# Patient Record
Sex: MALE | Race: BLACK OR AFRICAN AMERICAN | NOT HISPANIC OR LATINO | URBAN - METROPOLITAN AREA
[De-identification: names, ages, dates, MRNs, and addresses within clinical notes are randomized per-mention and may not be internally consistent; named-entity substitution may affect disease eponyms.]

---

## 2014-04-25 RX ORDER — DIAZEPAM 5 MG
1 TABLET ORAL
Qty: 0 | Refills: 0 | COMMUNITY
Start: 2014-04-25

## 2016-03-06 RX ORDER — OXYCODONE HYDROCHLORIDE 5 MG/1
1 TABLET ORAL
Qty: 0 | Refills: 0 | COMMUNITY
Start: 2016-03-06

## 2017-01-25 ENCOUNTER — OUTPATIENT (OUTPATIENT)
Dept: OUTPATIENT SERVICES | Facility: HOSPITAL | Age: 62
LOS: 1 days | Discharge: HOME | End: 2017-01-25

## 2017-01-25 DIAGNOSIS — C61 MALIGNANT NEOPLASM OF PROSTATE: ICD-10-CM

## 2017-01-26 ENCOUNTER — RESULT REVIEW (OUTPATIENT)
Age: 62
End: 2017-01-26

## 2017-01-27 ENCOUNTER — APPOINTMENT (OUTPATIENT)
Dept: HEMATOLOGY ONCOLOGY | Facility: CLINIC | Age: 62
End: 2017-01-27

## 2017-01-27 VITALS
OXYGEN SATURATION: 99 % | BODY MASS INDEX: 30.48 KG/M2 | WEIGHT: 176.37 LBS | RESPIRATION RATE: 16 BRPM | HEART RATE: 79 BPM | TEMPERATURE: 98.5 F | DIASTOLIC BLOOD PRESSURE: 80 MMHG | SYSTOLIC BLOOD PRESSURE: 120 MMHG

## 2017-01-27 LAB
HCT VFR BLD CALC: 34 % — LOW (ref 39–50)
HGB BLD-MCNC: 11.3 G/DL — LOW (ref 13–17)
MCHC RBC-ENTMCNC: 28.9 PG — SIGNIFICANT CHANGE UP (ref 27–34)
MCHC RBC-ENTMCNC: 33.3 GM/DL — SIGNIFICANT CHANGE UP (ref 32–36)
MCV RBC AUTO: 86.7 FL — SIGNIFICANT CHANGE UP (ref 80–100)
PLATELET # BLD AUTO: 180 K/UL — SIGNIFICANT CHANGE UP (ref 150–400)
RBC # BLD: 3.92 M/UL — LOW (ref 4.2–5.8)
RBC # FLD: 12.4 % — SIGNIFICANT CHANGE UP (ref 10.3–14.5)
WBC # BLD: 6.5 K/UL — SIGNIFICANT CHANGE UP (ref 3.8–10.5)
WBC # FLD AUTO: 6.5 K/UL — SIGNIFICANT CHANGE UP (ref 3.8–10.5)

## 2017-01-30 LAB
ALBUMIN SERPL ELPH-MCNC: 3.4 G/DL
ALP BLD-CCNC: 96 U/L
ALT SERPL-CCNC: 12 U/L
ANION GAP SERPL CALC-SCNC: 11 MMOL/L
AST SERPL-CCNC: 15 U/L
BILIRUB SERPL-MCNC: 0.4 MG/DL
BUN SERPL-MCNC: 21 MG/DL
CALCIUM SERPL-MCNC: 8.8 MG/DL
CHLORIDE SERPL-SCNC: 108 MMOL/L
CO2 SERPL-SCNC: 28 MMOL/L
CREAT SERPL-MCNC: 0.92 MG/DL
GLUCOSE SERPL-MCNC: 98 MG/DL
LDH SERPL-CCNC: 134 U/L
POTASSIUM SERPL-SCNC: 3.9 MMOL/L
PROT SERPL-MCNC: 5.4 G/DL
PSA SERPL-MCNC: 75.41 NG/ML
SODIUM SERPL-SCNC: 147 MMOL/L

## 2017-02-25 ENCOUNTER — EMERGENCY (EMERGENCY)
Facility: HOSPITAL | Age: 62
LOS: 1 days | Discharge: HOME | End: 2017-02-25
Attending: EMERGENCY MEDICINE | Admitting: EMERGENCY MEDICINE
Payer: COMMERCIAL

## 2017-02-25 VITALS
RESPIRATION RATE: 16 BRPM | DIASTOLIC BLOOD PRESSURE: 85 MMHG | OXYGEN SATURATION: 99 % | TEMPERATURE: 98 F | SYSTOLIC BLOOD PRESSURE: 146 MMHG | HEART RATE: 64 BPM

## 2017-02-25 VITALS
HEART RATE: 72 BPM | SYSTOLIC BLOOD PRESSURE: 152 MMHG | DIASTOLIC BLOOD PRESSURE: 83 MMHG | RESPIRATION RATE: 18 BRPM | OXYGEN SATURATION: 98 %

## 2017-02-25 DIAGNOSIS — Z91.048 OTHER NONMEDICINAL SUBSTANCE ALLERGY STATUS: ICD-10-CM

## 2017-02-25 DIAGNOSIS — M54.5 LOW BACK PAIN: ICD-10-CM

## 2017-02-25 DIAGNOSIS — I10 ESSENTIAL (PRIMARY) HYPERTENSION: ICD-10-CM

## 2017-02-25 LAB
ALBUMIN SERPL ELPH-MCNC: 3.3 G/DL — SIGNIFICANT CHANGE UP (ref 3.3–5)
ALP SERPL-CCNC: 97 U/L — SIGNIFICANT CHANGE UP (ref 40–120)
ALT FLD-CCNC: 11 U/L RC — SIGNIFICANT CHANGE UP (ref 10–45)
ANION GAP SERPL CALC-SCNC: 12 MMOL/L — SIGNIFICANT CHANGE UP (ref 5–17)
APPEARANCE UR: CLEAR — SIGNIFICANT CHANGE UP
APTT BLD: 31.3 SEC — SIGNIFICANT CHANGE UP (ref 27.5–37.4)
AST SERPL-CCNC: 13 U/L — SIGNIFICANT CHANGE UP (ref 10–40)
BACTERIA # UR AUTO: ABNORMAL /HPF
BASOPHILS # BLD AUTO: 0.1 K/UL — SIGNIFICANT CHANGE UP (ref 0–0.2)
BASOPHILS NFR BLD AUTO: 0.9 % — SIGNIFICANT CHANGE UP (ref 0–2)
BILIRUB SERPL-MCNC: 0.4 MG/DL — SIGNIFICANT CHANGE UP (ref 0.2–1.2)
BILIRUB UR-MCNC: NEGATIVE — SIGNIFICANT CHANGE UP
BUN SERPL-MCNC: 20 MG/DL — SIGNIFICANT CHANGE UP (ref 7–23)
CALCIUM SERPL-MCNC: 8.7 MG/DL — SIGNIFICANT CHANGE UP (ref 8.4–10.5)
CHLORIDE SERPL-SCNC: 108 MMOL/L — SIGNIFICANT CHANGE UP (ref 96–108)
CO2 SERPL-SCNC: 25 MMOL/L — SIGNIFICANT CHANGE UP (ref 22–31)
COLOR SPEC: YELLOW — SIGNIFICANT CHANGE UP
CREAT SERPL-MCNC: 0.94 MG/DL — SIGNIFICANT CHANGE UP (ref 0.5–1.3)
DIFF PNL FLD: ABNORMAL
EOSINOPHIL # BLD AUTO: 0 K/UL — SIGNIFICANT CHANGE UP (ref 0–0.5)
EOSINOPHIL NFR BLD AUTO: 0.7 % — SIGNIFICANT CHANGE UP (ref 0–6)
EPI CELLS # UR: SIGNIFICANT CHANGE UP /HPF
GLUCOSE SERPL-MCNC: 95 MG/DL — SIGNIFICANT CHANGE UP (ref 70–99)
GLUCOSE UR QL: NEGATIVE — SIGNIFICANT CHANGE UP
HCT VFR BLD CALC: 32.5 % — LOW (ref 39–50)
HGB BLD-MCNC: 10.9 G/DL — LOW (ref 13–17)
INR BLD: 1.08 RATIO — SIGNIFICANT CHANGE UP (ref 0.88–1.16)
KETONES UR-MCNC: NEGATIVE — SIGNIFICANT CHANGE UP
LEUKOCYTE ESTERASE UR-ACNC: NEGATIVE — SIGNIFICANT CHANGE UP
LYMPHOCYTES # BLD AUTO: 1.8 K/UL — SIGNIFICANT CHANGE UP (ref 1–3.3)
LYMPHOCYTES # BLD AUTO: 33.1 % — SIGNIFICANT CHANGE UP (ref 13–44)
MCHC RBC-ENTMCNC: 29.3 PG — SIGNIFICANT CHANGE UP (ref 27–34)
MCHC RBC-ENTMCNC: 33.7 GM/DL — SIGNIFICANT CHANGE UP (ref 32–36)
MCV RBC AUTO: 87.2 FL — SIGNIFICANT CHANGE UP (ref 80–100)
MONOCYTES # BLD AUTO: 0.3 K/UL — SIGNIFICANT CHANGE UP (ref 0–0.9)
MONOCYTES NFR BLD AUTO: 6 % — SIGNIFICANT CHANGE UP (ref 2–14)
NEUTROPHILS # BLD AUTO: 3.2 K/UL — SIGNIFICANT CHANGE UP (ref 1.8–7.4)
NEUTROPHILS NFR BLD AUTO: 59.3 % — SIGNIFICANT CHANGE UP (ref 43–77)
NITRITE UR-MCNC: NEGATIVE — SIGNIFICANT CHANGE UP
PH UR: 6.5 — SIGNIFICANT CHANGE UP (ref 4.8–8)
PLATELET # BLD AUTO: 193 K/UL — SIGNIFICANT CHANGE UP (ref 150–400)
POTASSIUM SERPL-MCNC: 4 MMOL/L — SIGNIFICANT CHANGE UP (ref 3.5–5.3)
POTASSIUM SERPL-SCNC: 4 MMOL/L — SIGNIFICANT CHANGE UP (ref 3.5–5.3)
PROT SERPL-MCNC: 5.4 G/DL — LOW (ref 6–8.3)
PROT UR-MCNC: 30 MG/DL
PROTHROM AB SERPL-ACNC: 11.8 SEC — SIGNIFICANT CHANGE UP (ref 10–13.1)
RBC # BLD: 3.73 M/UL — LOW (ref 4.2–5.8)
RBC # FLD: 12.2 % — SIGNIFICANT CHANGE UP (ref 10.3–14.5)
RBC CASTS # UR COMP ASSIST: ABNORMAL /HPF (ref 0–2)
SODIUM SERPL-SCNC: 145 MMOL/L — SIGNIFICANT CHANGE UP (ref 135–145)
SP GR SPEC: 1.02 — SIGNIFICANT CHANGE UP (ref 1.01–1.02)
UROBILINOGEN FLD QL: NEGATIVE — SIGNIFICANT CHANGE UP
WBC # BLD: 5.5 K/UL — SIGNIFICANT CHANGE UP (ref 3.8–10.5)
WBC # FLD AUTO: 5.5 K/UL — SIGNIFICANT CHANGE UP (ref 3.8–10.5)
WBC UR QL: SIGNIFICANT CHANGE UP /HPF (ref 0–5)

## 2017-02-25 PROCEDURE — 99284 EMERGENCY DEPT VISIT MOD MDM: CPT

## 2017-02-25 PROCEDURE — 85027 COMPLETE CBC AUTOMATED: CPT

## 2017-02-25 PROCEDURE — 85730 THROMBOPLASTIN TIME PARTIAL: CPT

## 2017-02-25 PROCEDURE — 85610 PROTHROMBIN TIME: CPT

## 2017-02-25 PROCEDURE — 80053 COMPREHEN METABOLIC PANEL: CPT

## 2017-02-25 PROCEDURE — 81001 URINALYSIS AUTO W/SCOPE: CPT

## 2017-02-25 RX ORDER — DIAZEPAM 5 MG
5 TABLET ORAL ONCE
Qty: 0 | Refills: 0 | Status: DISCONTINUED | OUTPATIENT
Start: 2017-02-25 | End: 2017-02-25

## 2017-02-25 RX ORDER — SODIUM CHLORIDE 9 MG/ML
1000 INJECTION INTRAMUSCULAR; INTRAVENOUS; SUBCUTANEOUS
Qty: 0 | Refills: 0 | Status: DISCONTINUED | OUTPATIENT
Start: 2017-02-25 | End: 2017-03-01

## 2017-02-25 RX ORDER — SODIUM CHLORIDE 9 MG/ML
3 INJECTION INTRAMUSCULAR; INTRAVENOUS; SUBCUTANEOUS ONCE
Qty: 0 | Refills: 0 | Status: COMPLETED | OUTPATIENT
Start: 2017-02-25 | End: 2017-02-25

## 2017-02-25 RX ORDER — OXYCODONE HYDROCHLORIDE 5 MG/1
5 TABLET ORAL ONCE
Qty: 0 | Refills: 0 | Status: DISCONTINUED | OUTPATIENT
Start: 2017-02-25 | End: 2017-02-25

## 2017-02-25 RX ADMIN — OXYCODONE HYDROCHLORIDE 5 MILLIGRAM(S): 5 TABLET ORAL at 11:15

## 2017-02-25 RX ADMIN — OXYCODONE HYDROCHLORIDE 5 MILLIGRAM(S): 5 TABLET ORAL at 10:12

## 2017-02-25 RX ADMIN — SODIUM CHLORIDE 3 MILLILITER(S): 9 INJECTION INTRAMUSCULAR; INTRAVENOUS; SUBCUTANEOUS at 10:13

## 2017-02-25 RX ADMIN — Medication 5 MILLIGRAM(S): at 10:13

## 2017-02-25 RX ADMIN — SODIUM CHLORIDE 125 MILLILITER(S): 9 INJECTION INTRAMUSCULAR; INTRAVENOUS; SUBCUTANEOUS at 10:13
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
## 2017-02-25 NOTE — ED PROVIDER NOTE - OBJECTIVE STATEMENT
62yo with hx of htn, prostate CA,  chronic left leg swelling comes to the ED with complaints of back pain for the past 2 night, pain in the right lower lower back and left upper back , comes and goes but now constant, no radiation of the pain, no releived with massage, has had this pain in the past before, currently taking oral chemo for the prostate ca, no f.c no numbness no tingling reported, no f.c no cp no sob, no abd pain, no difficulty urination or with bowel movement.

## 2017-02-25 NOTE — ED PROVIDER NOTE - PHYSICAL EXAMINATION
att exam: patient awake alert NAD . LUNGS CTAB no wheeze no crackle. CARD RRR no m/r/g.  Abdomen soft NT ND no rebound no guarding no CVA tenderness. EXT WWP no edema no calf tenderness CV 2+DP/PT bilaterally. neuro A&Ox3 gait normal.  skin warm and dry no rash TTP to the right lower paraspinal region, no midline lower back pain, TTP to the left upper subscapular area, no midline pain, 5/5 strenght sensation intact upper and lower ext.  2+ edema of the left lower ext.

## 2017-02-25 NOTE — ED PROVIDER NOTE - ATTENDING CONTRIBUTION TO CARE
60 yo with hx of prostate ca, sciatica, htn, comes to the ED with complaints of right lower and left upper back pain for the past few days, has had this pain in the past before, no f.c no redflag back pain symtpoms, but given CA hx will check labs, xray and reassess.

## 2017-02-26 NOTE — ED POST DISCHARGE NOTE - OTHER COMMUNICATION
Spoke with patient regarding UA results. Denies fever, dysuria, gross hematuria. Rec f/u with PCP to repeat UA. - Kendal Thomas PA-C

## 2017-02-27 ENCOUNTER — OTHER (OUTPATIENT)
Age: 62
End: 2017-02-27

## 2017-02-28 ENCOUNTER — RESULT REVIEW (OUTPATIENT)
Age: 62
End: 2017-02-28

## 2017-02-28 ENCOUNTER — OUTPATIENT (OUTPATIENT)
Dept: OUTPATIENT SERVICES | Facility: HOSPITAL | Age: 62
LOS: 1 days | Discharge: HOME | End: 2017-02-28
Payer: COMMERCIAL

## 2017-02-28 DIAGNOSIS — C78.7 SECONDARY MALIGNANT NEOPLASM OF LIVER AND INTRAHEPATIC BILE DUCT: ICD-10-CM

## 2017-02-28 DIAGNOSIS — C61 MALIGNANT NEOPLASM OF PROSTATE: ICD-10-CM

## 2017-03-01 ENCOUNTER — APPOINTMENT (OUTPATIENT)
Dept: HEMATOLOGY ONCOLOGY | Facility: CLINIC | Age: 62
End: 2017-03-01

## 2017-03-01 ENCOUNTER — APPOINTMENT (OUTPATIENT)
Dept: INFUSION THERAPY | Facility: HOSPITAL | Age: 62
End: 2017-03-01

## 2017-03-01 VITALS
SYSTOLIC BLOOD PRESSURE: 131 MMHG | HEART RATE: 71 BPM | RESPIRATION RATE: 16 BRPM | TEMPERATURE: 98.1 F | DIASTOLIC BLOOD PRESSURE: 78 MMHG | WEIGHT: 178.13 LBS | BODY MASS INDEX: 30.79 KG/M2 | OXYGEN SATURATION: 98 %

## 2017-03-01 LAB
HCT VFR BLD CALC: 33.7 % — LOW (ref 39–50)
HGB BLD-MCNC: 11.2 G/DL — LOW (ref 13–17)
MCHC RBC-ENTMCNC: 28.7 PG — SIGNIFICANT CHANGE UP (ref 27–34)
MCHC RBC-ENTMCNC: 33.3 G/DL — SIGNIFICANT CHANGE UP (ref 32–36)
MCV RBC AUTO: 86.2 FL — SIGNIFICANT CHANGE UP (ref 80–100)
PLATELET # BLD AUTO: 233 K/UL — SIGNIFICANT CHANGE UP (ref 150–400)
RBC # BLD: 3.92 M/UL — LOW (ref 4.2–5.8)
RBC # FLD: 11.8 % — SIGNIFICANT CHANGE UP (ref 10.3–14.5)
WBC # BLD: 6.8 K/UL — SIGNIFICANT CHANGE UP (ref 3.8–10.5)
WBC # FLD AUTO: 6.8 K/UL — SIGNIFICANT CHANGE UP (ref 3.8–10.5)

## 2017-03-03 LAB
ALBUMIN SERPL ELPH-MCNC: 3.3 G/DL
ALP BLD-CCNC: 123 U/L
ALT SERPL-CCNC: 14 U/L
ANION GAP SERPL CALC-SCNC: 15 MMOL/L
AST SERPL-CCNC: 13 U/L
BILIRUB SERPL-MCNC: 0.4 MG/DL
BUN SERPL-MCNC: 18 MG/DL
CALCIUM SERPL-MCNC: 9.2 MG/DL
CHLORIDE SERPL-SCNC: 109 MMOL/L
CO2 SERPL-SCNC: 23 MMOL/L
CREAT SERPL-MCNC: 0.94 MG/DL
GLUCOSE SERPL-MCNC: 93 MG/DL
LDH SERPL-CCNC: 139 U/L
POTASSIUM SERPL-SCNC: 4.3 MMOL/L
PROT SERPL-MCNC: 5.4 G/DL
PSA SERPL-MCNC: 125.4 NG/ML
SODIUM SERPL-SCNC: 147 MMOL/L
TESTOST SERPL-MCNC: <2.5 NG/DL

## 2017-03-09 ENCOUNTER — APPOINTMENT (OUTPATIENT)
Dept: NUCLEAR MEDICINE | Facility: IMAGING CENTER | Age: 62
End: 2017-03-09

## 2017-03-09 ENCOUNTER — APPOINTMENT (OUTPATIENT)
Dept: CT IMAGING | Facility: IMAGING CENTER | Age: 62
End: 2017-03-09

## 2017-03-09 ENCOUNTER — OUTPATIENT (OUTPATIENT)
Dept: OUTPATIENT SERVICES | Facility: HOSPITAL | Age: 62
LOS: 1 days | End: 2017-03-09
Payer: COMMERCIAL

## 2017-03-09 DIAGNOSIS — C78.7 SECONDARY MALIGNANT NEOPLASM OF LIVER AND INTRAHEPATIC BILE DUCT: ICD-10-CM

## 2017-03-09 DIAGNOSIS — C61 MALIGNANT NEOPLASM OF PROSTATE: ICD-10-CM

## 2017-03-09 PROCEDURE — 74177 CT ABD & PELVIS W/CONTRAST: CPT

## 2017-03-09 PROCEDURE — A9561: CPT

## 2017-03-09 PROCEDURE — 78306 BONE IMAGING WHOLE BODY: CPT

## 2017-03-09 PROCEDURE — 78999 UNLISTED MISC PX DX NUC MED: CPT

## 2017-03-17 ENCOUNTER — OTHER (OUTPATIENT)
Age: 62
End: 2017-03-17

## 2017-03-20 ENCOUNTER — RX RENEWAL (OUTPATIENT)
Age: 62
End: 2017-03-20

## 2017-03-20 ENCOUNTER — RESULT REVIEW (OUTPATIENT)
Age: 62
End: 2017-03-20

## 2017-03-21 ENCOUNTER — FORM ENCOUNTER (OUTPATIENT)
Age: 62
End: 2017-03-21

## 2017-03-21 ENCOUNTER — APPOINTMENT (OUTPATIENT)
Dept: HEMATOLOGY ONCOLOGY | Facility: CLINIC | Age: 62
End: 2017-03-21

## 2017-03-21 VITALS
BODY MASS INDEX: 30.22 KG/M2 | SYSTOLIC BLOOD PRESSURE: 118 MMHG | HEART RATE: 87 BPM | OXYGEN SATURATION: 98 % | DIASTOLIC BLOOD PRESSURE: 82 MMHG | WEIGHT: 174.82 LBS | TEMPERATURE: 99 F

## 2017-03-21 LAB
HCT VFR BLD CALC: 34.2 % — LOW (ref 39–50)
HGB BLD-MCNC: 11.6 G/DL — LOW (ref 13–17)
MCHC RBC-ENTMCNC: 28.7 PG — SIGNIFICANT CHANGE UP (ref 27–34)
MCHC RBC-ENTMCNC: 33.9 G/DL — SIGNIFICANT CHANGE UP (ref 32–36)
MCV RBC AUTO: 84.6 FL — SIGNIFICANT CHANGE UP (ref 80–100)
PLATELET # BLD AUTO: 279 K/UL — SIGNIFICANT CHANGE UP (ref 150–400)
RBC # BLD: 4.04 M/UL — LOW (ref 4.2–5.8)
RBC # FLD: 12 % — SIGNIFICANT CHANGE UP (ref 10.3–14.5)
WBC # BLD: 6.8 K/UL — SIGNIFICANT CHANGE UP (ref 3.8–10.5)
WBC # FLD AUTO: 6.8 K/UL — SIGNIFICANT CHANGE UP (ref 3.8–10.5)

## 2017-03-21 PROCEDURE — 93010 ELECTROCARDIOGRAM REPORT: CPT

## 2017-03-22 ENCOUNTER — APPOINTMENT (OUTPATIENT)
Dept: MRI IMAGING | Facility: CLINIC | Age: 62
End: 2017-03-22

## 2017-03-22 ENCOUNTER — OUTPATIENT (OUTPATIENT)
Dept: OUTPATIENT SERVICES | Facility: HOSPITAL | Age: 62
LOS: 1 days | End: 2017-03-22
Payer: COMMERCIAL

## 2017-03-22 DIAGNOSIS — C61 MALIGNANT NEOPLASM OF PROSTATE: ICD-10-CM

## 2017-03-22 LAB
ALBUMIN SERPL ELPH-MCNC: 3.3 G/DL
ALP BLD-CCNC: 184 U/L
ALT SERPL-CCNC: 6 U/L
ANION GAP SERPL CALC-SCNC: 13 MMOL/L
AST SERPL-CCNC: 15 U/L
BILIRUB SERPL-MCNC: 0.3 MG/DL
BUN SERPL-MCNC: 14 MG/DL
CALCIUM SERPL-MCNC: 9.4 MG/DL
CHLORIDE SERPL-SCNC: 107 MMOL/L
CO2 SERPL-SCNC: 24 MMOL/L
CREAT SERPL-MCNC: 1 MG/DL
GLUCOSE SERPL-MCNC: 92 MG/DL
HBV CORE IGG+IGM SER QL: NONREACTIVE
HBV SURFACE AB SER QL: NONREACTIVE
HBV SURFACE AG SER QL: NONREACTIVE
HCV AB SER QL: NONREACTIVE
HCV S/CO RATIO: 0.14 S/CO
POTASSIUM SERPL-SCNC: 4 MMOL/L
PROT SERPL-MCNC: 5.4 G/DL
PSA SERPL-MCNC: 157.4 NG/ML
SODIUM SERPL-SCNC: 144 MMOL/L

## 2017-03-22 PROCEDURE — 82565 ASSAY OF CREATININE: CPT

## 2017-03-22 PROCEDURE — A9585: CPT

## 2017-03-22 PROCEDURE — 72158 MRI LUMBAR SPINE W/O & W/DYE: CPT

## 2017-03-24 ENCOUNTER — OUTPATIENT (OUTPATIENT)
Dept: OUTPATIENT SERVICES | Facility: HOSPITAL | Age: 62
LOS: 1 days | Discharge: HOME | End: 2017-03-24

## 2017-03-26 ENCOUNTER — RESULT REVIEW (OUTPATIENT)
Age: 62
End: 2017-03-26

## 2017-03-27 ENCOUNTER — APPOINTMENT (OUTPATIENT)
Dept: INFUSION THERAPY | Facility: HOSPITAL | Age: 62
End: 2017-03-27

## 2017-03-27 LAB
HCT VFR BLD CALC: 32.8 % — LOW (ref 39–50)
HGB BLD-MCNC: 11.1 G/DL — LOW (ref 13–17)
MCHC RBC-ENTMCNC: 28.5 PG — SIGNIFICANT CHANGE UP (ref 27–34)
MCHC RBC-ENTMCNC: 33.8 G/DL — SIGNIFICANT CHANGE UP (ref 32–36)
MCV RBC AUTO: 84.3 FL — SIGNIFICANT CHANGE UP (ref 80–100)
PLATELET # BLD AUTO: 303 K/UL — SIGNIFICANT CHANGE UP (ref 150–400)
RBC # BLD: 3.89 M/UL — LOW (ref 4.2–5.8)
RBC # FLD: 12 % — SIGNIFICANT CHANGE UP (ref 10.3–14.5)
WBC # BLD: 10.3 K/UL — SIGNIFICANT CHANGE UP (ref 3.8–10.5)
WBC # FLD AUTO: 10.3 K/UL — SIGNIFICANT CHANGE UP (ref 3.8–10.5)

## 2017-03-28 ENCOUNTER — APPOINTMENT (OUTPATIENT)
Dept: INFUSION THERAPY | Facility: HOSPITAL | Age: 62
End: 2017-03-28

## 2017-03-29 DIAGNOSIS — Z51.89 ENCOUNTER FOR OTHER SPECIFIED AFTERCARE: ICD-10-CM

## 2017-03-30 ENCOUNTER — APPOINTMENT (OUTPATIENT)
Dept: RADIATION ONCOLOGY | Facility: CLINIC | Age: 62
End: 2017-03-30

## 2017-03-30 VITALS
OXYGEN SATURATION: 98 % | HEART RATE: 95 BPM | RESPIRATION RATE: 16 BRPM | BODY MASS INDEX: 26.65 KG/M2 | WEIGHT: 156.09 LBS | TEMPERATURE: 96.8 F | HEIGHT: 64 IN | DIASTOLIC BLOOD PRESSURE: 88 MMHG | SYSTOLIC BLOOD PRESSURE: 170 MMHG

## 2017-03-31 ENCOUNTER — OUTPATIENT (OUTPATIENT)
Dept: OUTPATIENT SERVICES | Facility: HOSPITAL | Age: 62
LOS: 1 days | Discharge: HOME | End: 2017-03-31

## 2017-03-31 DIAGNOSIS — C61 MALIGNANT NEOPLASM OF PROSTATE: ICD-10-CM

## 2017-04-03 ENCOUNTER — RESULT REVIEW (OUTPATIENT)
Age: 62
End: 2017-04-03

## 2017-04-04 ENCOUNTER — APPOINTMENT (OUTPATIENT)
Dept: HEMATOLOGY ONCOLOGY | Facility: CLINIC | Age: 62
End: 2017-04-04

## 2017-04-04 VITALS
WEIGHT: 169.75 LBS | BODY MASS INDEX: 29.14 KG/M2 | HEART RATE: 89 BPM | TEMPERATURE: 98.4 F | DIASTOLIC BLOOD PRESSURE: 84 MMHG | RESPIRATION RATE: 16 BRPM | OXYGEN SATURATION: 98 % | SYSTOLIC BLOOD PRESSURE: 136 MMHG

## 2017-04-04 LAB
ANISOCYTOSIS BLD QL: SLIGHT — SIGNIFICANT CHANGE UP
BASOPHILS # BLD AUTO: 0 K/UL — SIGNIFICANT CHANGE UP (ref 0–0.2)
DACRYOCYTES BLD QL SMEAR: SLIGHT — SIGNIFICANT CHANGE UP
ELLIPTOCYTES BLD QL SMEAR: SLIGHT — SIGNIFICANT CHANGE UP
EOSINOPHIL # BLD AUTO: 0.1 K/UL — SIGNIFICANT CHANGE UP (ref 0–0.5)
EOSINOPHIL NFR BLD AUTO: 1 % — SIGNIFICANT CHANGE UP (ref 0–6)
HCT VFR BLD CALC: 32.4 % — LOW (ref 39–50)
HGB BLD-MCNC: 11.1 G/DL — LOW (ref 13–17)
HYPOCHROMIA BLD QL: SLIGHT — SIGNIFICANT CHANGE UP
LYMPHOCYTES # BLD AUTO: 19 % — SIGNIFICANT CHANGE UP (ref 13–44)
LYMPHOCYTES # BLD AUTO: 3.1 K/UL — SIGNIFICANT CHANGE UP (ref 1–3.3)
MACROCYTES BLD QL: SLIGHT — SIGNIFICANT CHANGE UP
MCHC RBC-ENTMCNC: 28.6 PG — SIGNIFICANT CHANGE UP (ref 27–34)
MCHC RBC-ENTMCNC: 34.1 G/DL — SIGNIFICANT CHANGE UP (ref 32–36)
MCV RBC AUTO: 83.9 FL — SIGNIFICANT CHANGE UP (ref 80–100)
METAMYELOCYTES # FLD: 4 % — HIGH (ref 0–0)
MICROCYTES BLD QL: SLIGHT — SIGNIFICANT CHANGE UP
MONOCYTES # BLD AUTO: 1.3 K/UL — HIGH (ref 0–0.9)
MONOCYTES NFR BLD AUTO: 3 % — SIGNIFICANT CHANGE UP (ref 2–14)
MYELOCYTES NFR BLD: 5 % — HIGH (ref 0–0)
NEUTROPHILS # BLD AUTO: 21.3 K/UL — HIGH (ref 1.8–7.4)
NEUTROPHILS NFR BLD AUTO: 46 % — SIGNIFICANT CHANGE UP (ref 43–77)
NEUTS BAND # BLD: 22 % — HIGH (ref 0–8)
PLAT MORPH BLD: NORMAL — SIGNIFICANT CHANGE UP
PLATELET # BLD AUTO: 225 K/UL — SIGNIFICANT CHANGE UP (ref 150–400)
POIKILOCYTOSIS BLD QL AUTO: SLIGHT — SIGNIFICANT CHANGE UP
POLYCHROMASIA BLD QL SMEAR: SLIGHT — SIGNIFICANT CHANGE UP
RBC # BLD: 3.87 M/UL — LOW (ref 4.2–5.8)
RBC # FLD: 12.2 % — SIGNIFICANT CHANGE UP (ref 10.3–14.5)
RBC BLD AUTO: ABNORMAL
SCHISTOCYTES BLD QL AUTO: SLIGHT — SIGNIFICANT CHANGE UP
WBC # BLD: 25.8 K/UL — HIGH (ref 3.8–10.5)
WBC # FLD AUTO: 25.8 K/UL — HIGH (ref 3.8–10.5)

## 2017-04-05 LAB
ALBUMIN SERPL ELPH-MCNC: 3.4 G/DL
ALP BLD-CCNC: 179 U/L
ALT SERPL-CCNC: 7 U/L
ANION GAP SERPL CALC-SCNC: 15 MMOL/L
AST SERPL-CCNC: 12 U/L
BILIRUB SERPL-MCNC: 0.5 MG/DL
BUN SERPL-MCNC: 14 MG/DL
CALCIUM SERPL-MCNC: 9 MG/DL
CHLORIDE SERPL-SCNC: 102 MMOL/L
CO2 SERPL-SCNC: 24 MMOL/L
CREAT SERPL-MCNC: 1.05 MG/DL
GLUCOSE SERPL-MCNC: 68 MG/DL
LDH SERPL-CCNC: 361 U/L
MAGNESIUM SERPL-MCNC: 1.8 MG/DL
POTASSIUM SERPL-SCNC: 4.2 MMOL/L
PROT SERPL-MCNC: 5.4 G/DL
PSA SERPL-MCNC: 158.3 NG/ML
SODIUM SERPL-SCNC: 141 MMOL/L

## 2017-04-06 ENCOUNTER — OTHER (OUTPATIENT)
Age: 62
End: 2017-04-06

## 2017-04-07 ENCOUNTER — RX RENEWAL (OUTPATIENT)
Age: 62
End: 2017-04-07

## 2017-04-16 ENCOUNTER — RESULT REVIEW (OUTPATIENT)
Age: 62
End: 2017-04-16

## 2017-04-17 ENCOUNTER — APPOINTMENT (OUTPATIENT)
Dept: INFUSION THERAPY | Facility: HOSPITAL | Age: 62
End: 2017-04-17

## 2017-04-17 LAB
HCT VFR BLD CALC: 33.1 % — LOW (ref 39–50)
HGB BLD-MCNC: 10.9 G/DL — LOW (ref 13–17)
MCHC RBC-ENTMCNC: 28.1 PG — SIGNIFICANT CHANGE UP (ref 27–34)
MCHC RBC-ENTMCNC: 33.1 G/DL — SIGNIFICANT CHANGE UP (ref 32–36)
MCV RBC AUTO: 84.8 FL — SIGNIFICANT CHANGE UP (ref 80–100)
PLATELET # BLD AUTO: 332 K/UL — SIGNIFICANT CHANGE UP (ref 150–400)
RBC # BLD: 3.9 M/UL — LOW (ref 4.2–5.8)
RBC # FLD: 14.6 % — HIGH (ref 10.3–14.5)
WBC # BLD: 11.9 K/UL — HIGH (ref 3.8–10.5)
WBC # FLD AUTO: 11.9 K/UL — HIGH (ref 3.8–10.5)

## 2017-04-18 ENCOUNTER — APPOINTMENT (OUTPATIENT)
Dept: INFUSION THERAPY | Facility: HOSPITAL | Age: 62
End: 2017-04-18

## 2017-04-19 DIAGNOSIS — Z51.89 ENCOUNTER FOR OTHER SPECIFIED AFTERCARE: ICD-10-CM

## 2017-04-20 DIAGNOSIS — Z51.11 ENCOUNTER FOR ANTINEOPLASTIC CHEMOTHERAPY: ICD-10-CM

## 2017-04-20 DIAGNOSIS — D70.1 AGRANULOCYTOSIS SECONDARY TO CANCER CHEMOTHERAPY: ICD-10-CM

## 2017-04-20 DIAGNOSIS — C78.7 SECONDARY MALIGNANT NEOPLASM OF LIVER AND INTRAHEPATIC BILE DUCT: ICD-10-CM

## 2017-04-20 DIAGNOSIS — R11.2 NAUSEA WITH VOMITING, UNSPECIFIED: ICD-10-CM

## 2017-04-21 ENCOUNTER — MEDICATION RENEWAL (OUTPATIENT)
Age: 62
End: 2017-04-21

## 2017-04-24 ENCOUNTER — RESULT REVIEW (OUTPATIENT)
Age: 62
End: 2017-04-24

## 2017-04-25 ENCOUNTER — APPOINTMENT (OUTPATIENT)
Dept: HEMATOLOGY ONCOLOGY | Facility: CLINIC | Age: 62
End: 2017-04-25

## 2017-04-25 VITALS
BODY MASS INDEX: 28.38 KG/M2 | SYSTOLIC BLOOD PRESSURE: 130 MMHG | DIASTOLIC BLOOD PRESSURE: 80 MMHG | HEART RATE: 83 BPM | WEIGHT: 165.32 LBS | RESPIRATION RATE: 16 BRPM | OXYGEN SATURATION: 99 % | TEMPERATURE: 98.2 F

## 2017-04-25 LAB
HCT VFR BLD CALC: 30.7 % — LOW (ref 39–50)
HGB BLD-MCNC: 10.1 G/DL — LOW (ref 13–17)
MCHC RBC-ENTMCNC: 28.5 PG — SIGNIFICANT CHANGE UP (ref 27–34)
MCHC RBC-ENTMCNC: 33 G/DL — SIGNIFICANT CHANGE UP (ref 32–36)
MCV RBC AUTO: 86.4 FL — SIGNIFICANT CHANGE UP (ref 80–100)
PLATELET # BLD AUTO: 221 K/UL — SIGNIFICANT CHANGE UP (ref 150–400)
RBC # BLD: 3.56 M/UL — LOW (ref 4.2–5.8)
RBC # FLD: 15.2 % — HIGH (ref 10.3–14.5)
WBC # BLD: 16.6 K/UL — HIGH (ref 3.8–10.5)
WBC # FLD AUTO: 16.6 K/UL — HIGH (ref 3.8–10.5)

## 2017-04-25 RX ORDER — DOCETAXEL 80 MG/4ML
INJECTION, SOLUTION, CONCENTRATE INTRAVENOUS
Refills: 0 | Status: DISCONTINUED | COMMUNITY
End: 2017-04-25

## 2017-04-26 LAB — PSA SERPL-MCNC: 114.3 NG/ML

## 2017-05-04 ENCOUNTER — OUTPATIENT (OUTPATIENT)
Dept: OUTPATIENT SERVICES | Facility: HOSPITAL | Age: 62
LOS: 1 days | Discharge: HOME | End: 2017-05-04

## 2017-05-04 DIAGNOSIS — D70.1 AGRANULOCYTOSIS SECONDARY TO CANCER CHEMOTHERAPY: ICD-10-CM

## 2017-05-04 DIAGNOSIS — C78.7 SECONDARY MALIGNANT NEOPLASM OF LIVER AND INTRAHEPATIC BILE DUCT: ICD-10-CM

## 2017-05-04 DIAGNOSIS — C61 MALIGNANT NEOPLASM OF PROSTATE: ICD-10-CM

## 2017-05-08 ENCOUNTER — RESULT REVIEW (OUTPATIENT)
Age: 62
End: 2017-05-08

## 2017-05-08 ENCOUNTER — APPOINTMENT (OUTPATIENT)
Dept: INFUSION THERAPY | Facility: HOSPITAL | Age: 62
End: 2017-05-08

## 2017-05-08 LAB
ALBUMIN SERPL ELPH-MCNC: 3.6 G/DL
ALP BLD-CCNC: 183 U/L
ALT SERPL-CCNC: 9 U/L
ANION GAP SERPL CALC-SCNC: 16 MMOL/L
AST SERPL-CCNC: 9 U/L
BILIRUB SERPL-MCNC: 0.5 MG/DL
BUN SERPL-MCNC: 13 MG/DL
CALCIUM SERPL-MCNC: 9.1 MG/DL
CHLORIDE SERPL-SCNC: 107 MMOL/L
CO2 SERPL-SCNC: 22 MMOL/L
CREAT SERPL-MCNC: 0.87 MG/DL
GLUCOSE SERPL-MCNC: 102 MG/DL
HCT VFR BLD CALC: 34.8 % — LOW (ref 39–50)
HGB BLD-MCNC: 11.3 G/DL — LOW (ref 13–17)
MCHC RBC-ENTMCNC: 28.6 PG — SIGNIFICANT CHANGE UP (ref 27–34)
MCHC RBC-ENTMCNC: 32.4 G/DL — SIGNIFICANT CHANGE UP (ref 32–36)
MCV RBC AUTO: 88.3 FL — SIGNIFICANT CHANGE UP (ref 80–100)
PLATELET # BLD AUTO: 250 K/UL — SIGNIFICANT CHANGE UP (ref 150–400)
POTASSIUM SERPL-SCNC: 4.5 MMOL/L
PROT SERPL-MCNC: 5.5 G/DL
RBC # BLD: 3.94 M/UL — LOW (ref 4.2–5.8)
RBC # FLD: 17.2 % — HIGH (ref 10.3–14.5)
SODIUM SERPL-SCNC: 144 MMOL/L
WBC # BLD: 9.4 K/UL — SIGNIFICANT CHANGE UP (ref 3.8–10.5)
WBC # FLD AUTO: 9.4 K/UL — SIGNIFICANT CHANGE UP (ref 3.8–10.5)

## 2017-05-09 ENCOUNTER — APPOINTMENT (OUTPATIENT)
Dept: INFUSION THERAPY | Facility: HOSPITAL | Age: 62
End: 2017-05-09

## 2017-05-10 DIAGNOSIS — Z51.89 ENCOUNTER FOR OTHER SPECIFIED AFTERCARE: ICD-10-CM

## 2017-05-12 ENCOUNTER — INPATIENT (INPATIENT)
Facility: HOSPITAL | Age: 62
LOS: 4 days | Discharge: HOME | End: 2017-05-17
Attending: INTERNAL MEDICINE | Admitting: INTERNAL MEDICINE
Payer: COMMERCIAL

## 2017-05-12 VITALS
RESPIRATION RATE: 18 BRPM | DIASTOLIC BLOOD PRESSURE: 67 MMHG | OXYGEN SATURATION: 100 % | HEART RATE: 90 BPM | TEMPERATURE: 99 F | SYSTOLIC BLOOD PRESSURE: 120 MMHG

## 2017-05-12 DIAGNOSIS — K92.2 GASTROINTESTINAL HEMORRHAGE, UNSPECIFIED: ICD-10-CM

## 2017-05-12 LAB
ALBUMIN SERPL ELPH-MCNC: 3.4 G/DL — SIGNIFICANT CHANGE UP (ref 3.3–5)
ALP SERPL-CCNC: 183 U/L — HIGH (ref 40–120)
ALT FLD-CCNC: 9 U/L — SIGNIFICANT CHANGE UP (ref 4–41)
APPEARANCE UR: SIGNIFICANT CHANGE UP
APTT BLD: 36.5 SEC — SIGNIFICANT CHANGE UP (ref 27.5–37.4)
AST SERPL-CCNC: 12 U/L — SIGNIFICANT CHANGE UP (ref 4–40)
BILIRUB SERPL-MCNC: 0.7 MG/DL — SIGNIFICANT CHANGE UP (ref 0.2–1.2)
BILIRUB UR-MCNC: NEGATIVE — SIGNIFICANT CHANGE UP
BLOOD UR QL VISUAL: HIGH
BUN SERPL-MCNC: 21 MG/DL — SIGNIFICANT CHANGE UP (ref 7–23)
CALCIUM SERPL-MCNC: 8.6 MG/DL — SIGNIFICANT CHANGE UP (ref 8.4–10.5)
CHLORIDE SERPL-SCNC: 112 MMOL/L — HIGH (ref 98–107)
CO2 SERPL-SCNC: 23 MMOL/L — SIGNIFICANT CHANGE UP (ref 22–31)
COLOR SPEC: YELLOW — SIGNIFICANT CHANGE UP
CREAT SERPL-MCNC: 0.9 MG/DL — SIGNIFICANT CHANGE UP (ref 0.5–1.3)
GLUCOSE SERPL-MCNC: 102 MG/DL — HIGH (ref 70–99)
GLUCOSE UR-MCNC: NEGATIVE — SIGNIFICANT CHANGE UP
HCT VFR BLD CALC: 32 % — LOW (ref 39–50)
HGB BLD-MCNC: 10 G/DL — LOW (ref 13–17)
INR BLD: 1.56 — HIGH (ref 0.88–1.17)
KETONES UR-MCNC: NEGATIVE — SIGNIFICANT CHANGE UP
LEUKOCYTE ESTERASE UR-ACNC: NEGATIVE — SIGNIFICANT CHANGE UP
MCHC RBC-ENTMCNC: 27.9 PG — SIGNIFICANT CHANGE UP (ref 27–34)
MCHC RBC-ENTMCNC: 31.3 % — LOW (ref 32–36)
MCV RBC AUTO: 89.1 FL — SIGNIFICANT CHANGE UP (ref 80–100)
MUCOUS THREADS # UR AUTO: SIGNIFICANT CHANGE UP
NITRITE UR-MCNC: NEGATIVE — SIGNIFICANT CHANGE UP
OB PNL STL: POSITIVE — SIGNIFICANT CHANGE UP
PH UR: 6 — SIGNIFICANT CHANGE UP (ref 4.6–8)
PLATELET # BLD AUTO: 166 K/UL — SIGNIFICANT CHANGE UP (ref 150–400)
PMV BLD: 9.1 FL — SIGNIFICANT CHANGE UP (ref 7–13)
POTASSIUM SERPL-MCNC: 3.9 MMOL/L — SIGNIFICANT CHANGE UP (ref 3.5–5.3)
POTASSIUM SERPL-SCNC: 3.9 MMOL/L — SIGNIFICANT CHANGE UP (ref 3.5–5.3)
PROT SERPL-MCNC: 5.4 G/DL — LOW (ref 6–8.3)
PROT UR-MCNC: 100 — SIGNIFICANT CHANGE UP
PROTHROM AB SERPL-ACNC: 17.6 SEC — HIGH (ref 9.8–13.1)
RBC # BLD: 3.59 M/UL — LOW (ref 4.2–5.8)
RBC # FLD: 19.2 % — HIGH (ref 10.3–14.5)
RBC CASTS # UR COMP ASSIST: >50 — HIGH (ref 0–?)
SODIUM SERPL-SCNC: 149 MMOL/L — HIGH (ref 135–145)
SP GR SPEC: 1.02 — SIGNIFICANT CHANGE UP (ref 1–1.03)
SQUAMOUS # UR AUTO: SIGNIFICANT CHANGE UP
UROBILINOGEN FLD QL: NORMAL E.U. — SIGNIFICANT CHANGE UP (ref 0.1–0.2)
WBC # BLD: 19.41 K/UL — HIGH (ref 3.8–10.5)
WBC # FLD AUTO: 19.41 K/UL — HIGH (ref 3.8–10.5)
WBC UR QL: HIGH (ref 0–?)

## 2017-05-12 RX ORDER — PANTOPRAZOLE SODIUM 20 MG/1
80 TABLET, DELAYED RELEASE ORAL ONCE
Qty: 0 | Refills: 0 | Status: COMPLETED | OUTPATIENT
Start: 2017-05-12 | End: 2017-05-12

## 2017-05-12 NOTE — ED ADULT TRIAGE NOTE - CHIEF COMPLAINT QUOTE
red card alonso- rectal bleed    pt had 2 episodes of rectal bleed- this am only when wiping...at approx 8:30pm, filled toilet bowl with bright red blood. had diarhea 1 week- took immodium today. denies pain but feels "bubbling" in abd. has hx of prostate ca- finished radiation 2 weeks ago... has chemo every 3 weeks- last on monday

## 2017-05-12 NOTE — ED PROVIDER NOTE - OBJECTIVE STATEMENT
62 yo male with metastatic prostate cancer presents with GIB.  PT had a large amount of bright red blood per rectum today straight into the toilet. Pt not on anticoagulation.  His last chemo was on Monday.

## 2017-05-12 NOTE — ED PROVIDER NOTE - ATTENDING CONTRIBUTION TO CARE
62 yo male with prostate cancer mets to liver on chemo 4 days ago with BRBPR, red blood in toilet, no Anticoagulation // NL vitals afebrile no distress ao3 rrr s1s2 cta bl abd sntnd no cva or calf tenderness//labs occult hh

## 2017-05-12 NOTE — ED ADULT NURSE NOTE - OBJECTIVE STATEMENT
alert no distress   c/o BRBPR x 2 tonight  no c/o pain dizziness or SOB appears pale  seen by Dr West

## 2017-05-13 DIAGNOSIS — I89.0 LYMPHEDEMA, NOT ELSEWHERE CLASSIFIED: ICD-10-CM

## 2017-05-13 DIAGNOSIS — Z29.9 ENCOUNTER FOR PROPHYLACTIC MEASURES, UNSPECIFIED: ICD-10-CM

## 2017-05-13 DIAGNOSIS — K92.2 GASTROINTESTINAL HEMORRHAGE, UNSPECIFIED: ICD-10-CM

## 2017-05-13 DIAGNOSIS — C61 MALIGNANT NEOPLASM OF PROSTATE: ICD-10-CM

## 2017-05-13 DIAGNOSIS — I10 ESSENTIAL (PRIMARY) HYPERTENSION: ICD-10-CM

## 2017-05-13 LAB
BASOPHILS # BLD AUTO: 0.65 K/UL — HIGH (ref 0–0.2)
BASOPHILS NFR BLD AUTO: 4.7 % — HIGH (ref 0–2)
BASOPHILS NFR SPEC: 0 % — SIGNIFICANT CHANGE UP (ref 0–2)
BLD GP AB SCN SERPL QL: NEGATIVE — SIGNIFICANT CHANGE UP
BUN SERPL-MCNC: 17 MG/DL — SIGNIFICANT CHANGE UP (ref 7–23)
CALCIUM SERPL-MCNC: 8.4 MG/DL — SIGNIFICANT CHANGE UP (ref 8.4–10.5)
CHLORIDE SERPL-SCNC: 112 MMOL/L — HIGH (ref 98–107)
CO2 SERPL-SCNC: 18 MMOL/L — LOW (ref 22–31)
CREAT SERPL-MCNC: 0.72 MG/DL — SIGNIFICANT CHANGE UP (ref 0.5–1.3)
EOSINOPHIL # BLD AUTO: 0.05 K/UL — SIGNIFICANT CHANGE UP (ref 0–0.5)
EOSINOPHIL NFR BLD AUTO: 0.4 % — SIGNIFICANT CHANGE UP (ref 0–6)
EOSINOPHIL NFR FLD: 0 % — SIGNIFICANT CHANGE UP (ref 0–6)
GGT SERPL-CCNC: 12 U/L — SIGNIFICANT CHANGE UP (ref 8–61)
GIANT PLATELETS BLD QL SMEAR: PRESENT — SIGNIFICANT CHANGE UP
GLUCOSE SERPL-MCNC: 91 MG/DL — SIGNIFICANT CHANGE UP (ref 70–99)
HCT VFR BLD CALC: 30.9 % — LOW (ref 39–50)
HGB BLD-MCNC: 9.9 G/DL — LOW (ref 13–17)
IMM GRANULOCYTES NFR BLD AUTO: 7.1 % — HIGH (ref 0–1.5)
LYMPHOCYTES # BLD AUTO: 1.22 K/UL — SIGNIFICANT CHANGE UP (ref 1–3.3)
LYMPHOCYTES # BLD AUTO: 8.8 % — LOW (ref 13–44)
LYMPHOCYTES NFR SPEC AUTO: 5.3 % — LOW (ref 13–44)
MAGNESIUM SERPL-MCNC: 1.8 MG/DL — SIGNIFICANT CHANGE UP (ref 1.6–2.6)
MANUAL SMEAR VERIFICATION: SIGNIFICANT CHANGE UP
MCHC RBC-ENTMCNC: 28.4 PG — SIGNIFICANT CHANGE UP (ref 27–34)
MCHC RBC-ENTMCNC: 32 % — SIGNIFICANT CHANGE UP (ref 32–36)
MCV RBC AUTO: 88.5 FL — SIGNIFICANT CHANGE UP (ref 80–100)
MONOCYTES # BLD AUTO: 0.09 K/UL — SIGNIFICANT CHANGE UP (ref 0–0.9)
MONOCYTES NFR BLD AUTO: 0.6 % — LOW (ref 2–14)
MONOCYTES NFR BLD: 0.9 % — LOW (ref 2–9)
NEUTROPHIL AB SER-ACNC: 93.8 % — HIGH (ref 43–77)
NEUTROPHILS # BLD AUTO: 10.92 K/UL — HIGH (ref 1.8–7.4)
NEUTROPHILS NFR BLD AUTO: 78.4 % — HIGH (ref 43–77)
OVALOCYTES BLD QL SMEAR: SLIGHT — SIGNIFICANT CHANGE UP
PHOSPHATE SERPL-MCNC: 2.9 MG/DL — SIGNIFICANT CHANGE UP (ref 2.5–4.5)
PLATELET # BLD AUTO: 174 K/UL — SIGNIFICANT CHANGE UP (ref 150–400)
PLATELET COUNT - ESTIMATE: NORMAL — SIGNIFICANT CHANGE UP
PMV BLD: 10.3 FL — SIGNIFICANT CHANGE UP (ref 7–13)
POIKILOCYTOSIS BLD QL AUTO: SLIGHT — SIGNIFICANT CHANGE UP
POTASSIUM SERPL-MCNC: 4.1 MMOL/L — SIGNIFICANT CHANGE UP (ref 3.5–5.3)
POTASSIUM SERPL-SCNC: 4.1 MMOL/L — SIGNIFICANT CHANGE UP (ref 3.5–5.3)
RBC # BLD: 3.49 M/UL — LOW (ref 4.2–5.8)
RBC # FLD: 19.4 % — HIGH (ref 10.3–14.5)
RH IG SCN BLD-IMP: POSITIVE — SIGNIFICANT CHANGE UP
SODIUM SERPL-SCNC: 144 MMOL/L — SIGNIFICANT CHANGE UP (ref 135–145)
WBC # BLD: 13.92 K/UL — HIGH (ref 3.8–10.5)
WBC # FLD AUTO: 13.92 K/UL — HIGH (ref 3.8–10.5)

## 2017-05-13 PROCEDURE — 12345: CPT | Mod: GC,NC

## 2017-05-13 PROCEDURE — 99223 1ST HOSP IP/OBS HIGH 75: CPT | Mod: GC

## 2017-05-13 RX ORDER — PANTOPRAZOLE SODIUM 20 MG/1
40 TABLET, DELAYED RELEASE ORAL
Qty: 0 | Refills: 0 | Status: DISCONTINUED | OUTPATIENT
Start: 2017-05-13 | End: 2017-05-17

## 2017-05-13 RX ORDER — SODIUM CHLORIDE 9 MG/ML
1000 INJECTION, SOLUTION INTRAVENOUS
Qty: 0 | Refills: 0 | Status: DISCONTINUED | OUTPATIENT
Start: 2017-05-13 | End: 2017-05-17

## 2017-05-13 RX ADMIN — PANTOPRAZOLE SODIUM 40 MILLIGRAM(S): 20 TABLET, DELAYED RELEASE ORAL at 17:09

## 2017-05-13 RX ADMIN — SODIUM CHLORIDE 100 MILLILITER(S): 9 INJECTION, SOLUTION INTRAVENOUS at 08:48

## 2017-05-13 RX ADMIN — SODIUM CHLORIDE 100 MILLILITER(S): 9 INJECTION, SOLUTION INTRAVENOUS at 21:13

## 2017-05-13 RX ADMIN — PANTOPRAZOLE SODIUM 80 MILLIGRAM(S): 20 TABLET, DELAYED RELEASE ORAL at 01:47

## 2017-05-13 NOTE — H&P ADULT. - PROBLEM SELECTOR PLAN 4
-elevate left leg  -low suspicion for underlying DVT; previous doppler in record negative, no change in symptoms since then

## 2017-05-13 NOTE — H&P ADULT. - GASTROINTESTINAL DETAILS
no bruit/no masses palpable/nontender/no rebound tenderness/no rigidity/soft/bowel sounds normal/no organomegaly/no guarding

## 2017-05-13 NOTE — H&P ADULT. - LAB RESULTS AND INTERPRETATION
CBC: anemia stable but worsening RDW compared to previous checks; repeat H/H pending. Worsening leukocytosis compared to baseline, suspect possible leukemoid reaction; differential added to morning labs  PT/INR: elevated INR to 1.5, normal PTT; not on anticoagulation. Could potentially be 2/2 liver dysfunction from hx of mets, though albumin w/in normal limits. Will recheck PT/PTT with next set of labs if checking repeat CBC  CMP: elevated alk phos w/ otherwise normal bilirubin, AST/ALT, likely 2/2 prostate cancer and/or spinal mets. GGT added to morning labs. Mild hypernatremia to 149; rechecking labs, but if still elevated will start on IV fluids.  UA: RBCs >50; patient denies any gross hematuria. Suspect 2/2 chemotherapy, but will monitor. No signs of UTI on hx or on physical (no suprapubic tenderness).

## 2017-05-13 NOTE — H&P ADULT. - NEUROLOGICAL DETAILS
sensation intact/no spontaneous movement/alert and oriented x 3/cranial nerves intact/responds to pain/deep reflexes intact/responds to verbal commands

## 2017-05-13 NOTE — H&P ADULT. - PROBLEM SELECTOR PLAN 1
-suspect likely -suspect likely 2/2 hemorrhoids appreciated during exam, has now abated s/p protonix drip for possible UGI x1  -might need colonoscopy to r/o radiation colitis or chemo-mediated GI toxicity if still having bleeding episodes or if H/H drops; had diarrhea this morning, watery w/o significant bleeding, so might be able to obtain as an outpatient

## 2017-05-13 NOTE — H&P ADULT. - RS GEN PE MLT RESP DETAILS PC
airway patent/clear to auscultation bilaterally/breath sounds equal/respirations non-labored/good air movement/no chest wall tenderness

## 2017-05-13 NOTE — H&P ADULT. - ASSESSMENT
62 yo M w/ hx of HTN, metastatic prostate cancer (mets to spine and to liver) presenting with 1 day of bright red blood per rectum. Reports that he had received chemotherapy on 5/8/17, reports tolerated it well, developed diarrhea on Saturday (normal brown stools), had 2-3x episodes during the day, but later developed diarrhea at around 9:30 PM; reports that he noticed blood on the toilet paper only when wiped, but his wife reported that there was "blood all over the toilet bowl" (patient reports he did not see it. Reports that he normally gets diarrhea after chemotherapy; had tried taking imodium before the blood appeared. Reports he felt like he was straining particularly hard prior to onset of blood. Patient not currently taking anticoagulation; had received radiation therapy for his spinal and hip mets; denies any previous hx of significant GI bleeds. Does not know if he has hemorrhoids, but does endorse history of blood on toilet when he "wipes hard enough. Denies abdominal pain, nausea, vomiting, hx of ulcers; had colonoscopy approximately 4-5 years ago that was "normal."     During interview, patient had an episode where he had water stools; in toilet, small amount of blood noted. On exam, external nonbleeding hemorrhoids noted, nontender to touch; internal hemorrhoids palpated, no gross blood on digital rectal exam. Occult blood noted to be positive on admission labs. 62 yo M w/ hx of HTN, metastatic prostate cancer (mets to spine and to liver) presenting with 1 day of bright red blood per rectum, suspect possibly 2/2 internal hemorrhoids vs radiation colitis vs cabazitaxel GI toxicity; less likely upper GI bleed; hemodynamically stable, not orthostatic.

## 2017-05-13 NOTE — H&P ADULT. - HISTORY OF PRESENT ILLNESS
60 yo M w/ hx of HTN, metastatic prostate cancer (mets to spine and to liver) presenting with 1 day of bright red blood per rectum. Reports that he had received chemotherapy on 5/8/17, reports tolerated it well, developed diarrhea on Saturday (normal brown stools), had 2-3x episodes during the day, but later developed diarrhea at around 9:30 PM; reports that he noticed blood on the toilet paper only when wiped, but his wife reported that there was "blood all over the toilet bowl" (patient reports he did not see it. Reports that he normally gets diarrhea after chemotherapy; had tried taking imodium before the blood appeared. Reports he felt like he was straining particularly hard prior to onset of blood. Patient not currently taking anticoagulation; had received radiation therapy for his spinal and hip mets; denies any previous hx of significant GI bleeds. Does not know if he has hemorrhoids, but does endorse history of blood on toilet when he "wipes hard enough. Denies abdominal pain, nausea, vomiting, hx of ulcers; had colonoscopy approximately 4-5 years ago that was "normal."     During interview, patient had an episode where he had water stools; in toilet, small amount of blood noted. On exam, external nonbleeding hemorrhoids noted, nontender to touch; internal hemorrhoids palpated, no gross blood on digital rectal exam. Occult blood noted to be positive on admission labs. 60 yo M w/ hx of HTN, metastatic prostate cancer (Fracisco 9 at diagnosis, liver metastases TNM stage: T3b, N1, M1 AJCC Stage: IV; also mets to spine) presenting with 1 day of bright red blood per rectum. Reports that he had received chemotherapy on 5/8/17, reports tolerated it well, developed diarrhea on Saturday (normal brown stools), had 2-3x episodes during the day, but later developed diarrhea at around 9:30 PM; reports that he noticed blood on the toilet paper only when wiped, but his wife reported that there was "blood all over the toilet bowl" (patient reports he did not see it. Reports that he normally gets diarrhea after chemotherapy; had tried taking imodium before the blood appeared. Reports he felt like he was straining particularly hard prior to onset of blood. Patient not currently taking anticoagulation; had received radiation therapy for his spinal and hip mets; denies any previous hx of significant GI bleeds. Does not know if he has hemorrhoids, but does endorse history of blood on toilet when he "wipes hard enough. Denies abdominal pain, nausea, vomiting, hx of ulcers; had colonoscopy approximately 4-5 years ago that was "normal."     During interview, patient had an episode where he had water stools; in toilet, small amount of blood noted. On exam, external nonbleeding hemorrhoids noted, nontender to touch; internal hemorrhoids palpated, no gross blood on digital rectal exam. Occult blood noted to be positive on admission labs.

## 2017-05-13 NOTE — H&P ADULT. - MUSCULOSKELETAL
details… detailed exam no joint erythema/no calf tenderness/normal strength/no joint swelling/no joint warmth/ROM intact

## 2017-05-13 NOTE — H&P ADULT. - NEGATIVE NEUROLOGICAL SYMPTOMS
no paresthesias/no transient paralysis/no weakness/no vertigo/no loss of sensation/no focal seizures/no syncope/no generalized seizures/no tremors

## 2017-05-13 NOTE — ED ADULT NURSE REASSESSMENT NOTE - NS ED NURSE REASSESS COMMENT FT1
alert no distress    resting quietly without c/o   report given to Karmen to transfer to [facility]

## 2017-05-14 LAB
BACTERIA UR CULT: SIGNIFICANT CHANGE UP
BUN SERPL-MCNC: 9 MG/DL — SIGNIFICANT CHANGE UP (ref 7–23)
CALCIUM SERPL-MCNC: 8.5 MG/DL — SIGNIFICANT CHANGE UP (ref 8.4–10.5)
CHLORIDE SERPL-SCNC: 111 MMOL/L — HIGH (ref 98–107)
CO2 SERPL-SCNC: 23 MMOL/L — SIGNIFICANT CHANGE UP (ref 22–31)
CREAT SERPL-MCNC: 0.71 MG/DL — SIGNIFICANT CHANGE UP (ref 0.5–1.3)
GLUCOSE SERPL-MCNC: 106 MG/DL — HIGH (ref 70–99)
HCT VFR BLD CALC: 28.4 % — LOW (ref 39–50)
HCT VFR BLD CALC: 28.6 % — LOW (ref 39–50)
HCT VFR BLD CALC: 29.9 % — LOW (ref 39–50)
HGB BLD-MCNC: 8.9 G/DL — LOW (ref 13–17)
HGB BLD-MCNC: 9 G/DL — LOW (ref 13–17)
HGB BLD-MCNC: 9.3 G/DL — LOW (ref 13–17)
MAGNESIUM SERPL-MCNC: 1.8 MG/DL — SIGNIFICANT CHANGE UP (ref 1.6–2.6)
MCHC RBC-ENTMCNC: 27.7 PG — SIGNIFICANT CHANGE UP (ref 27–34)
MCHC RBC-ENTMCNC: 27.8 PG — SIGNIFICANT CHANGE UP (ref 27–34)
MCHC RBC-ENTMCNC: 28 PG — SIGNIFICANT CHANGE UP (ref 27–34)
MCHC RBC-ENTMCNC: 31.1 % — LOW (ref 32–36)
MCHC RBC-ENTMCNC: 31.3 % — LOW (ref 32–36)
MCHC RBC-ENTMCNC: 31.5 % — LOW (ref 32–36)
MCV RBC AUTO: 88.8 FL — SIGNIFICANT CHANGE UP (ref 80–100)
MCV RBC AUTO: 88.8 FL — SIGNIFICANT CHANGE UP (ref 80–100)
MCV RBC AUTO: 89 FL — SIGNIFICANT CHANGE UP (ref 80–100)
PHOSPHATE SERPL-MCNC: 2.7 MG/DL — SIGNIFICANT CHANGE UP (ref 2.5–4.5)
PLATELET # BLD AUTO: 144 K/UL — LOW (ref 150–400)
PLATELET # BLD AUTO: 144 K/UL — LOW (ref 150–400)
PLATELET # BLD AUTO: 151 K/UL — SIGNIFICANT CHANGE UP (ref 150–400)
PMV BLD: 9.3 FL — SIGNIFICANT CHANGE UP (ref 7–13)
PMV BLD: 9.4 FL — SIGNIFICANT CHANGE UP (ref 7–13)
PMV BLD: 9.6 FL — SIGNIFICANT CHANGE UP (ref 7–13)
POTASSIUM SERPL-MCNC: 4.3 MMOL/L — SIGNIFICANT CHANGE UP (ref 3.5–5.3)
POTASSIUM SERPL-SCNC: 4.3 MMOL/L — SIGNIFICANT CHANGE UP (ref 3.5–5.3)
RBC # BLD: 3.2 M/UL — LOW (ref 4.2–5.8)
RBC # BLD: 3.22 M/UL — LOW (ref 4.2–5.8)
RBC # BLD: 3.36 M/UL — LOW (ref 4.2–5.8)
RBC # FLD: 18.3 % — HIGH (ref 10.3–14.5)
RBC # FLD: 18.6 % — HIGH (ref 10.3–14.5)
RBC # FLD: 18.8 % — HIGH (ref 10.3–14.5)
SODIUM SERPL-SCNC: 144 MMOL/L — SIGNIFICANT CHANGE UP (ref 135–145)
SPECIMEN SOURCE: SIGNIFICANT CHANGE UP
WBC # BLD: 7.77 K/UL — SIGNIFICANT CHANGE UP (ref 3.8–10.5)
WBC # BLD: 8.18 K/UL — SIGNIFICANT CHANGE UP (ref 3.8–10.5)
WBC # BLD: 9.97 K/UL — SIGNIFICANT CHANGE UP (ref 3.8–10.5)
WBC # FLD AUTO: 7.77 K/UL — SIGNIFICANT CHANGE UP (ref 3.8–10.5)
WBC # FLD AUTO: 8.18 K/UL — SIGNIFICANT CHANGE UP (ref 3.8–10.5)
WBC # FLD AUTO: 9.97 K/UL — SIGNIFICANT CHANGE UP (ref 3.8–10.5)

## 2017-05-14 PROCEDURE — 99233 SBSQ HOSP IP/OBS HIGH 50: CPT | Mod: GC

## 2017-05-14 RX ORDER — SOD SULF/SODIUM/NAHCO3/KCL/PEG
1000 SOLUTION, RECONSTITUTED, ORAL ORAL EVERY 6 HOURS
Qty: 0 | Refills: 0 | Status: DISCONTINUED | OUTPATIENT
Start: 2017-05-14 | End: 2017-05-14

## 2017-05-14 RX ORDER — MESALAMINE 400 MG
4 TABLET, DELAYED RELEASE (ENTERIC COATED) ORAL AT BEDTIME
Qty: 0 | Refills: 0 | Status: DISCONTINUED | OUTPATIENT
Start: 2017-05-14 | End: 2017-05-17

## 2017-05-14 RX ADMIN — PANTOPRAZOLE SODIUM 40 MILLIGRAM(S): 20 TABLET, DELAYED RELEASE ORAL at 17:51

## 2017-05-14 RX ADMIN — PANTOPRAZOLE SODIUM 40 MILLIGRAM(S): 20 TABLET, DELAYED RELEASE ORAL at 06:22

## 2017-05-14 RX ADMIN — SODIUM CHLORIDE 100 MILLILITER(S): 9 INJECTION, SOLUTION INTRAVENOUS at 15:03

## 2017-05-14 RX ADMIN — SODIUM CHLORIDE 100 MILLILITER(S): 9 INJECTION, SOLUTION INTRAVENOUS at 06:22

## 2017-05-15 LAB
BUN SERPL-MCNC: 7 MG/DL — SIGNIFICANT CHANGE UP (ref 7–23)
CALCIUM SERPL-MCNC: 8.3 MG/DL — LOW (ref 8.4–10.5)
CHLORIDE SERPL-SCNC: 110 MMOL/L — HIGH (ref 98–107)
CO2 SERPL-SCNC: 22 MMOL/L — SIGNIFICANT CHANGE UP (ref 22–31)
CREAT SERPL-MCNC: 0.71 MG/DL — SIGNIFICANT CHANGE UP (ref 0.5–1.3)
GLUCOSE SERPL-MCNC: 109 MG/DL — HIGH (ref 70–99)
HCT VFR BLD CALC: 28.4 % — LOW (ref 39–50)
HCT VFR BLD CALC: 30.7 % — LOW (ref 39–50)
HGB BLD-MCNC: 8.9 G/DL — LOW (ref 13–17)
HGB BLD-MCNC: 9.6 G/DL — LOW (ref 13–17)
MAGNESIUM SERPL-MCNC: 1.7 MG/DL — SIGNIFICANT CHANGE UP (ref 1.6–2.6)
MCHC RBC-ENTMCNC: 27.7 PG — SIGNIFICANT CHANGE UP (ref 27–34)
MCHC RBC-ENTMCNC: 27.8 PG — SIGNIFICANT CHANGE UP (ref 27–34)
MCHC RBC-ENTMCNC: 31.3 % — LOW (ref 32–36)
MCHC RBC-ENTMCNC: 31.3 % — LOW (ref 32–36)
MCV RBC AUTO: 88.5 FL — SIGNIFICANT CHANGE UP (ref 80–100)
MCV RBC AUTO: 88.8 FL — SIGNIFICANT CHANGE UP (ref 80–100)
PHOSPHATE SERPL-MCNC: 3.1 MG/DL — SIGNIFICANT CHANGE UP (ref 2.5–4.5)
PLATELET # BLD AUTO: 161 K/UL — SIGNIFICANT CHANGE UP (ref 150–400)
PLATELET # BLD AUTO: 174 K/UL — SIGNIFICANT CHANGE UP (ref 150–400)
PMV BLD: 10.2 FL — SIGNIFICANT CHANGE UP (ref 7–13)
PMV BLD: 9.8 FL — SIGNIFICANT CHANGE UP (ref 7–13)
POTASSIUM SERPL-MCNC: 3.6 MMOL/L — SIGNIFICANT CHANGE UP (ref 3.5–5.3)
POTASSIUM SERPL-SCNC: 3.6 MMOL/L — SIGNIFICANT CHANGE UP (ref 3.5–5.3)
RBC # BLD: 3.2 M/UL — LOW (ref 4.2–5.8)
RBC # BLD: 3.47 M/UL — LOW (ref 4.2–5.8)
RBC # FLD: 18.1 % — HIGH (ref 10.3–14.5)
RBC # FLD: 18.1 % — HIGH (ref 10.3–14.5)
SODIUM SERPL-SCNC: 143 MMOL/L — SIGNIFICANT CHANGE UP (ref 135–145)
WBC # BLD: 12.44 K/UL — HIGH (ref 3.8–10.5)
WBC # BLD: 9.27 K/UL — SIGNIFICANT CHANGE UP (ref 3.8–10.5)
WBC # FLD AUTO: 12.44 K/UL — HIGH (ref 3.8–10.5)
WBC # FLD AUTO: 9.27 K/UL — SIGNIFICANT CHANGE UP (ref 3.8–10.5)

## 2017-05-15 PROCEDURE — 99222 1ST HOSP IP/OBS MODERATE 55: CPT | Mod: GC

## 2017-05-15 PROCEDURE — 99233 SBSQ HOSP IP/OBS HIGH 50: CPT | Mod: GC

## 2017-05-15 RX ORDER — DIAZEPAM 5 MG
5 TABLET ORAL EVERY 8 HOURS
Qty: 0 | Refills: 0 | Status: DISCONTINUED | OUTPATIENT
Start: 2017-05-15 | End: 2017-05-17

## 2017-05-15 RX ORDER — SOD SULF/SODIUM/NAHCO3/KCL/PEG
1000 SOLUTION, RECONSTITUTED, ORAL ORAL EVERY 6 HOURS
Qty: 0 | Refills: 0 | Status: DISCONTINUED | OUTPATIENT
Start: 2017-05-15 | End: 2017-05-15

## 2017-05-15 RX ORDER — SOD SULF/SODIUM/NAHCO3/KCL/PEG
1000 SOLUTION, RECONSTITUTED, ORAL ORAL EVERY 6 HOURS
Qty: 0 | Refills: 0 | Status: COMPLETED | OUTPATIENT
Start: 2017-05-15 | End: 2017-05-15

## 2017-05-15 RX ADMIN — Medication 1000 MILLILITER(S): at 23:31

## 2017-05-15 RX ADMIN — Medication 5 MILLIGRAM(S): at 18:16

## 2017-05-15 RX ADMIN — Medication 1000 MILLILITER(S): at 18:15

## 2017-05-15 RX ADMIN — Medication 4 GRAM(S): at 00:11

## 2017-05-15 RX ADMIN — PANTOPRAZOLE SODIUM 40 MILLIGRAM(S): 20 TABLET, DELAYED RELEASE ORAL at 06:56

## 2017-05-15 RX ADMIN — Medication 5 MILLIGRAM(S): at 22:34

## 2017-05-15 RX ADMIN — Medication 5 MILLIGRAM(S): at 15:26

## 2017-05-15 RX ADMIN — PANTOPRAZOLE SODIUM 40 MILLIGRAM(S): 20 TABLET, DELAYED RELEASE ORAL at 18:17

## 2017-05-15 RX ADMIN — Medication 4 GRAM(S): at 22:34

## 2017-05-16 ENCOUNTER — RESULT REVIEW (OUTPATIENT)
Age: 62
End: 2017-05-16

## 2017-05-16 ENCOUNTER — APPOINTMENT (OUTPATIENT)
Dept: HEMATOLOGY ONCOLOGY | Facility: CLINIC | Age: 62
End: 2017-05-16

## 2017-05-16 LAB
BLD GP AB SCN SERPL QL: NEGATIVE — SIGNIFICANT CHANGE UP
BUN SERPL-MCNC: 8 MG/DL — SIGNIFICANT CHANGE UP (ref 7–23)
CALCIUM SERPL-MCNC: 8.9 MG/DL — SIGNIFICANT CHANGE UP (ref 8.4–10.5)
CHLORIDE SERPL-SCNC: 113 MMOL/L — HIGH (ref 98–107)
CO2 SERPL-SCNC: 21 MMOL/L — LOW (ref 22–31)
CREAT SERPL-MCNC: 0.87 MG/DL — SIGNIFICANT CHANGE UP (ref 0.5–1.3)
GLUCOSE SERPL-MCNC: 102 MG/DL — HIGH (ref 70–99)
HCT VFR BLD CALC: 32.5 % — LOW (ref 39–50)
HGB BLD-MCNC: 10.4 G/DL — LOW (ref 13–17)
MAGNESIUM SERPL-MCNC: 1.7 MG/DL — SIGNIFICANT CHANGE UP (ref 1.6–2.6)
MCHC RBC-ENTMCNC: 28.3 PG — SIGNIFICANT CHANGE UP (ref 27–34)
MCHC RBC-ENTMCNC: 32 % — SIGNIFICANT CHANGE UP (ref 32–36)
MCV RBC AUTO: 88.6 FL — SIGNIFICANT CHANGE UP (ref 80–100)
PHOSPHATE SERPL-MCNC: 3.2 MG/DL — SIGNIFICANT CHANGE UP (ref 2.5–4.5)
PLATELET # BLD AUTO: 195 K/UL — SIGNIFICANT CHANGE UP (ref 150–400)
PMV BLD: 9.8 FL — SIGNIFICANT CHANGE UP (ref 7–13)
POTASSIUM SERPL-MCNC: 4.1 MMOL/L — SIGNIFICANT CHANGE UP (ref 3.5–5.3)
POTASSIUM SERPL-SCNC: 4.1 MMOL/L — SIGNIFICANT CHANGE UP (ref 3.5–5.3)
RBC # BLD: 3.67 M/UL — LOW (ref 4.2–5.8)
RBC # FLD: 18.2 % — HIGH (ref 10.3–14.5)
RH IG SCN BLD-IMP: POSITIVE — SIGNIFICANT CHANGE UP
SODIUM SERPL-SCNC: 149 MMOL/L — HIGH (ref 135–145)
WBC # BLD: 16.53 K/UL — HIGH (ref 3.8–10.5)
WBC # FLD AUTO: 16.53 K/UL — HIGH (ref 3.8–10.5)

## 2017-05-16 PROCEDURE — 88305 TISSUE EXAM BY PATHOLOGIST: CPT | Mod: 26

## 2017-05-16 PROCEDURE — 45380 COLONOSCOPY AND BIOPSY: CPT | Mod: GC

## 2017-05-16 PROCEDURE — 99233 SBSQ HOSP IP/OBS HIGH 50: CPT | Mod: 25,GC

## 2017-05-16 PROCEDURE — 99232 SBSQ HOSP IP/OBS MODERATE 35: CPT | Mod: GC

## 2017-05-16 PROCEDURE — 99233 SBSQ HOSP IP/OBS HIGH 50: CPT | Mod: GC

## 2017-05-16 RX ORDER — METRONIDAZOLE 500 MG
500 TABLET ORAL EVERY 8 HOURS
Qty: 0 | Refills: 0 | Status: DISCONTINUED | OUTPATIENT
Start: 2017-05-16 | End: 2017-05-17

## 2017-05-16 RX ORDER — CIPROFLOXACIN LACTATE 400MG/40ML
500 VIAL (ML) INTRAVENOUS EVERY 12 HOURS
Qty: 0 | Refills: 0 | Status: DISCONTINUED | OUTPATIENT
Start: 2017-05-16 | End: 2017-05-17

## 2017-05-16 RX ADMIN — Medication 500 MILLIGRAM(S): at 21:05

## 2017-05-16 RX ADMIN — Medication 500 MILLIGRAM(S): at 13:33

## 2017-05-16 RX ADMIN — Medication 5 MILLIGRAM(S): at 21:05

## 2017-05-16 RX ADMIN — PANTOPRAZOLE SODIUM 40 MILLIGRAM(S): 20 TABLET, DELAYED RELEASE ORAL at 06:01

## 2017-05-16 RX ADMIN — Medication 5 MILLIGRAM(S): at 06:01

## 2017-05-16 RX ADMIN — Medication 5 MILLIGRAM(S): at 06:00

## 2017-05-16 RX ADMIN — Medication 500 MILLIGRAM(S): at 23:09

## 2017-05-16 RX ADMIN — PANTOPRAZOLE SODIUM 40 MILLIGRAM(S): 20 TABLET, DELAYED RELEASE ORAL at 17:34

## 2017-05-16 RX ADMIN — Medication 5 MILLIGRAM(S): at 17:34

## 2017-05-16 RX ADMIN — Medication 5 MILLIGRAM(S): at 13:33

## 2017-05-17 ENCOUNTER — TRANSCRIPTION ENCOUNTER (OUTPATIENT)
Age: 62
End: 2017-05-17

## 2017-05-17 VITALS
DIASTOLIC BLOOD PRESSURE: 72 MMHG | HEART RATE: 78 BPM | TEMPERATURE: 98 F | OXYGEN SATURATION: 99 % | SYSTOLIC BLOOD PRESSURE: 107 MMHG | RESPIRATION RATE: 17 BRPM

## 2017-05-17 DIAGNOSIS — K92.1 MELENA: ICD-10-CM

## 2017-05-17 LAB
BASOPHILS # BLD AUTO: 0.02 K/UL — SIGNIFICANT CHANGE UP (ref 0–0.2)
BASOPHILS NFR BLD AUTO: 0.1 % — SIGNIFICANT CHANGE UP (ref 0–2)
BUN SERPL-MCNC: 11 MG/DL — SIGNIFICANT CHANGE UP (ref 7–23)
CALCIUM SERPL-MCNC: 8.4 MG/DL — SIGNIFICANT CHANGE UP (ref 8.4–10.5)
CHLORIDE SERPL-SCNC: 112 MMOL/L — HIGH (ref 98–107)
CO2 SERPL-SCNC: 22 MMOL/L — SIGNIFICANT CHANGE UP (ref 22–31)
CREAT SERPL-MCNC: 0.99 MG/DL — SIGNIFICANT CHANGE UP (ref 0.5–1.3)
EOSINOPHIL # BLD AUTO: 0.05 K/UL — SIGNIFICANT CHANGE UP (ref 0–0.5)
EOSINOPHIL NFR BLD AUTO: 0.4 % — SIGNIFICANT CHANGE UP (ref 0–6)
GLUCOSE SERPL-MCNC: 102 MG/DL — HIGH (ref 70–99)
HCT VFR BLD CALC: 27 % — LOW (ref 39–50)
HCT VFR BLD CALC: 29.5 % — LOW (ref 39–50)
HGB BLD-MCNC: 8.6 G/DL — LOW (ref 13–17)
HGB BLD-MCNC: 9.4 G/DL — LOW (ref 13–17)
IMM GRANULOCYTES NFR BLD AUTO: 5 % — HIGH (ref 0–1.5)
LYMPHOCYTES # BLD AUTO: 1.07 K/UL — SIGNIFICANT CHANGE UP (ref 1–3.3)
LYMPHOCYTES # BLD AUTO: 7.9 % — LOW (ref 13–44)
MAGNESIUM SERPL-MCNC: 1.6 MG/DL — SIGNIFICANT CHANGE UP (ref 1.6–2.6)
MCHC RBC-ENTMCNC: 28 PG — SIGNIFICANT CHANGE UP (ref 27–34)
MCHC RBC-ENTMCNC: 28.2 PG — SIGNIFICANT CHANGE UP (ref 27–34)
MCHC RBC-ENTMCNC: 31.9 % — LOW (ref 32–36)
MCHC RBC-ENTMCNC: 31.9 % — LOW (ref 32–36)
MCV RBC AUTO: 87.8 FL — SIGNIFICANT CHANGE UP (ref 80–100)
MCV RBC AUTO: 88.5 FL — SIGNIFICANT CHANGE UP (ref 80–100)
MONOCYTES # BLD AUTO: 0.8 K/UL — SIGNIFICANT CHANGE UP (ref 0–0.9)
MONOCYTES NFR BLD AUTO: 5.9 % — SIGNIFICANT CHANGE UP (ref 2–14)
NEUTROPHILS # BLD AUTO: 10.87 K/UL — HIGH (ref 1.8–7.4)
NEUTROPHILS NFR BLD AUTO: 80.7 % — HIGH (ref 43–77)
PHOSPHATE SERPL-MCNC: 2.8 MG/DL — SIGNIFICANT CHANGE UP (ref 2.5–4.5)
PLATELET # BLD AUTO: 196 K/UL — SIGNIFICANT CHANGE UP (ref 150–400)
PLATELET # BLD AUTO: 201 K/UL — SIGNIFICANT CHANGE UP (ref 150–400)
PMV BLD: 8.9 FL — SIGNIFICANT CHANGE UP (ref 7–13)
PMV BLD: 9.8 FL — SIGNIFICANT CHANGE UP (ref 7–13)
POTASSIUM SERPL-MCNC: 3.6 MMOL/L — SIGNIFICANT CHANGE UP (ref 3.5–5.3)
POTASSIUM SERPL-SCNC: 3.6 MMOL/L — SIGNIFICANT CHANGE UP (ref 3.5–5.3)
RBC # BLD: 3.05 M/UL — LOW (ref 4.2–5.8)
RBC # BLD: 3.36 M/UL — LOW (ref 4.2–5.8)
RBC # FLD: 18.4 % — HIGH (ref 10.3–14.5)
RBC # FLD: 18.5 % — HIGH (ref 10.3–14.5)
SODIUM SERPL-SCNC: 145 MMOL/L — SIGNIFICANT CHANGE UP (ref 135–145)
WBC # BLD: 13.49 K/UL — HIGH (ref 3.8–10.5)
WBC # BLD: 15.34 K/UL — HIGH (ref 3.8–10.5)
WBC # FLD AUTO: 13.49 K/UL — HIGH (ref 3.8–10.5)
WBC # FLD AUTO: 15.34 K/UL — HIGH (ref 3.8–10.5)

## 2017-05-17 PROCEDURE — 99232 SBSQ HOSP IP/OBS MODERATE 35: CPT | Mod: GC

## 2017-05-17 PROCEDURE — 99239 HOSP IP/OBS DSCHRG MGMT >30: CPT

## 2017-05-17 RX ORDER — PANTOPRAZOLE SODIUM 20 MG/1
1 TABLET, DELAYED RELEASE ORAL
Qty: 30 | Refills: 0 | OUTPATIENT
Start: 2017-05-17 | End: 2017-06-16

## 2017-05-17 RX ORDER — SODIUM CHLORIDE 9 MG/ML
500 INJECTION INTRAMUSCULAR; INTRAVENOUS; SUBCUTANEOUS ONCE
Qty: 0 | Refills: 0 | Status: COMPLETED | OUTPATIENT
Start: 2017-05-17 | End: 2017-05-17

## 2017-05-17 RX ORDER — METRONIDAZOLE 500 MG
1 TABLET ORAL
Qty: 27 | Refills: 0 | OUTPATIENT
Start: 2017-05-17 | End: 2017-05-26

## 2017-05-17 RX ORDER — PANTOPRAZOLE SODIUM 20 MG/1
40 TABLET, DELAYED RELEASE ORAL
Qty: 0 | Refills: 0 | Status: DISCONTINUED | OUTPATIENT
Start: 2017-05-17 | End: 2017-05-17

## 2017-05-17 RX ORDER — CIPROFLOXACIN LACTATE 400MG/40ML
1 VIAL (ML) INTRAVENOUS
Qty: 18 | Refills: 0 | OUTPATIENT
Start: 2017-05-17 | End: 2017-05-26

## 2017-05-17 RX ADMIN — SODIUM CHLORIDE 2000 MILLILITER(S): 9 INJECTION INTRAMUSCULAR; INTRAVENOUS; SUBCUTANEOUS at 02:39

## 2017-05-17 RX ADMIN — Medication 500 MILLIGRAM(S): at 05:39

## 2017-05-17 RX ADMIN — PANTOPRAZOLE SODIUM 40 MILLIGRAM(S): 20 TABLET, DELAYED RELEASE ORAL at 05:39

## 2017-05-17 RX ADMIN — Medication 500 MILLIGRAM(S): at 13:35

## 2017-05-17 RX ADMIN — Medication 5 MILLIGRAM(S): at 05:40

## 2017-05-17 RX ADMIN — PANTOPRAZOLE SODIUM 40 MILLIGRAM(S): 20 TABLET, DELAYED RELEASE ORAL at 13:37

## 2017-05-17 NOTE — DISCHARGE NOTE ADULT - CARE PROVIDERS DIRECT ADDRESSES
[email or web address]

## 2017-05-17 NOTE — PROGRESS NOTE ADULT - SUBJECTIVE AND OBJECTIVE BOX
62 y/o Male who presented with a chief complaint of BRBPR      SUBJECTIVE / OVERNIGHT EVENTS: No acute events overnight; denies any blood in stool. feeling well with no complaints    MEDICATIONS  (STANDING):  dextrose 5% + sodium chloride 0.45%. 1000milliLiter(s) IV Continuous <Continuous>  pantoprazole  Injectable 40milliGRAM(s) IV Push two times a day  mesalamine Enema 4Gram(s) Rectal at bedtime  predniSONE   Tablet 5milliGRAM(s) Oral two times a day  diazepam    Tablet 5milliGRAM(s) Oral every 8 hours  ciprofloxacin     Tablet 500milliGRAM(s) Oral every 12 hours  metroNIDAZOLE    Tablet 500milliGRAM(s) Oral every 8 hours    MEDICATIONS  (PRN):      Vital Signs Last 24 Hrs  T(C): 36.9, Max: 37.1 (05-16 @ 21:00)  HR: 85 (74 - 88)  BP: 115/71 (98/60 - 130/85)  RR: 18 (16 - 18)  SpO2: 100% (99% - 100%)  Wt(kg): --  CAPILLARY BLOOD GLUCOSE    I&O's Summary      PHYSICAL EXAM:  GENERAL: NAD, well-developed  HEAD:  Atraumatic, Normocephalic  EYES: EOMI, PERRLA, conjunctiva and sclera clear  NECK: Supple, No JVD  CHEST/LUNG: Clear to auscultation bilaterally; No wheeze  HEART: Regular rate and rhythm; No murmurs, rubs, or gallops  ABDOMEN: Soft, Nontender, Nondistended; Bowel sounds present  EXTREMITIES:  LLE lymphedema, stable  PSYCH: AAOx3  NEUROLOGY: non-focal  SKIN: No rashes or lesions    LABS:                        8.6    13.49 )-----------( 196      ( 17 May 2017 05:18 )             27.0     05-17    145  |  112<H>  |  11  ----------------------------<  102<H>  3.6   |  22  |  0.99    Ca    8.4      17 May 2017 05:18  Phos  2.8     05-17  Mg     1.6     05-17                RADIOLOGY & ADDITIONAL TESTS:    Imaging Personally Reviewed:    Consultant(s) Notes Reviewed:  GI    Care Discussed with Consultants/Other Providers:

## 2017-05-17 NOTE — DISCHARGE NOTE ADULT - ADDITIONAL INSTRUCTIONS
Please follow-up with your Oncologist Dr. Oshea upon discharge for further chemotherapy/radiation planning  Please follow-up with Dr. Mendes concerning your colonoscopy and need for future management  Please return to the ED for new/worsening symptoms

## 2017-05-17 NOTE — DISCHARGE NOTE ADULT - MEDICATION SUMMARY - MEDICATIONS TO TAKE
I will START or STAY ON the medications listed below when I get home from the hospital:    predniSONE 5 mg oral delayed release tablet  --  by mouth 2 times a day  -- Indication: For Need for prophylactic measure    metroNIDAZOLE 500 mg oral tablet  -- 1 tab(s) by mouth every 8 hours  -- Indication: For Colitis    oxyCODONE 5 mg oral capsule  -- 1 cap(s) by mouth every 6 hours, As Needed MDD:20mg - for severe pain  -- Caution federal law prohibits the transfer of this drug to any person other  than the person for whom it was prescribed.  It is very important that you take or use this exactly as directed.  Do not skip doses or discontinue unless directed by your doctor.  May cause drowsiness.  Alcohol may intensify this effect.  Use care when operating dangerous machinery.  This prescription cannot be refilled.  Using more of this medication than prescribed may cause serious breathing problems.    -- Indication: For Need for prophylactic measure    diazepam 5 mg oral tablet  -- 1 tab(s) by mouth every 8 hours, As Needed  -- Indication: For Need for prophylactic measure    Exforge 5 mg-320 mg oral tablet  -- 1 tab(s) by mouth once a day  -- Indication: For HTN (hypertension)    docusate sodium 100 mg oral capsule  -- 1 cap(s) by mouth 3 times a day  -- Indication: For Need for prophylactic measure    senna oral tablet  -- 2 tab(s) by mouth once a day (at bedtime)  -- Indication: For Need for prophylactic measure    pantoprazole 40 mg oral delayed release tablet  -- 1 tab(s) by mouth once a day (before a meal)  -- Indication: For GIB (gastrointestinal bleeding)    ciprofloxacin 500 mg oral tablet  -- 1 tab(s) by mouth every 12 hours  -- Indication: For Colitis    Multiple Vitamins oral tablet  -- 1 tab(s) by mouth once a day  -- Indication: For Need for prophylactic measure

## 2017-05-17 NOTE — DISCHARGE NOTE ADULT - CARE PLAN
Principal Discharge DX:	Gastrointestinal bleed  Secondary Diagnosis:	Prostate cancer  Secondary Diagnosis:	HTN (hypertension)  Secondary Diagnosis:	Lymphedema of left lower extremity Principal Discharge DX:	Gastrointestinal bleed  Goal:	Resolution  Instructions for follow-up, activity and diet:	You came to the hospital for rectal bleeding. Your hemoglobin remained stable throughout the hospitalization and you received a colonoscopy 5/16 showing either ischemic colitis or radiation/chemotherapy colitis. You were started empirically on antibiotics (Ciprofloxacin/Flagyl) to cover for ischemic colitis. You did well and did not need any blood transfusions. Please follow-up with Dr. Oshea and Dr. Mendes within 1-2 weeks of discharge concerning your recent hospitalization and need for future management.  Secondary Diagnosis:	Prostate cancer  Goal:	Prevent complication  Instructions for follow-up, activity and diet:	You came to the hospital with a diagnosis of Prostate cancer. You were seen by the Oncology team during your hospitalization whom did not recommend inpatient chemotherapy or radiation. Please follow-up with Dr. Oshea within 1 week of discharge concerning your recent hospitalization and need for future chemotherapy/radiation.  Secondary Diagnosis:	HTN (hypertension)  Goal:	Normal blood pressure  Instructions for follow-up, activity and diet:	You came to the hospital with a diagnosis of hypertension. You were managed off your home Exforge secondary to your bleeding. You did well. Please continue to take your home Exforge as prescribed and follow-up with your primary care physician within 1-2 weeks of discharge concerning your recent hospitalization and need for Exforge ongoing.  Secondary Diagnosis:	Lymphedema of left lower extremity  Goal:	Prevent complications  Instructions for follow-up, activity and diet:	You came to the hospital with a diagnosis of lymphedema of your left leg. You were managed with leg elevation. You did well. Please follow-up with your primary care physician within 1-2 weeks of discharge concerning your recent hospitalization.

## 2017-05-17 NOTE — DISCHARGE NOTE ADULT - CARE PROVIDER_API CALL
Olman Oshea), Hematology; Internal Medicine; Medical Oncology  [address] [place] [address]  Phone: [phone]  Fax: [phone] Olman Oshea), Hematology; Internal Medicine; Medical Oncology  [address] [place] [address]  Phone: [phone]  Fax: [phone]    Solo Mendes), Gastroenterology; Internal Medicine  [id number] [place] [id number]  Phone: [phone]  Fax: [phone]

## 2017-05-17 NOTE — DISCHARGE NOTE ADULT - PLAN OF CARE
Prevent complications You came to the hospital with a diagnosis of lymphedema of your left leg. You were managed with leg elevation. You did well. Please follow-up with your primary care physician within 1-2 weeks of discharge concerning your recent hospitalization. You came to the hospital with a diagnosis of hypertension. You were managed off your home Exforge secondary to your bleeding. You did well. Please continue to take your home Exforge as prescribed and follow-up with your primary care physician within 1-2 weeks of discharge concerning your recent hospitalization and need for Exforge ongoing. Normal blood pressure Prevent complication You came to the hospital with a diagnosis of Prostate cancer. You were seen by the Oncology team during your hospitalization whom did not recommend inpatient chemotherapy or radiation. Please follow-up with Dr. Oshea within 1 week of discharge concerning your recent hospitalization and need for future chemotherapy/radiation. Resolution You came to the hospital for rectal bleeding. Your hemoglobin remained stable throughout the hospitalization and you received a colonoscopy 5/16 showing either ischemic colitis or radiation/chemotherapy colitis. You were started empirically on antibiotics (Ciprofloxacin/Flagyl) to cover for ischemic colitis. You did well and did not need any blood transfusions. Please follow-up with Dr. Oshea and Dr. Mendes within 1-2 weeks of discharge concerning your recent hospitalization and need for future management.

## 2017-05-17 NOTE — PROGRESS NOTE ADULT - PROBLEM SELECTOR PLAN 1
Advance diet as tolerated   Cipro/flagyl orally for 5 days   Doubt mesalamine enema will help   Supportive care   Call with questions. Advance diet as tolerated   Cipro/flagyl orally for 5 days   Doubt mesalamine enema will help   Change PPI IV BID to PPI orally daily   Supportive care   Call with questions.

## 2017-05-17 NOTE — PROGRESS NOTE ADULT - ATTENDING COMMENTS
Repeat Hb 9.4, no more rectal bleed. Will d/c pt today with GI and Onc f/u. Continue Cipro/Flagyl to complete 10 days course.

## 2017-05-17 NOTE — CHART NOTE - NSCHARTNOTEFT_GEN_A_CORE
Plan discussed with patient and primary team. Pt seen/examined. Agree with fellow's note. 25min spent in pt care  Hemoglobin stable. Follow up pathology.  Outpatient G.I. follow up in one month. [phone]. Other recommendations in colonoscopy note. Hemoglobin 9.4. Suggest antibiotics Increased left shift. Will sign off.

## 2017-05-17 NOTE — PROGRESS NOTE ADULT - ASSESSMENT
61 year old M with HTN, metastatic prostate CA to liver and spine s/p radiation/chemotherapy (last chemo 5/8) p/w BRBPR x1 day - since resolved. Now s/p colonoscopy showing colonic mucosa consistent with ischemic colitis v. radiation colitis, started empirically on Cipro/Flagyl for ischemic colitis.    #GIB likely 2/2 ischemic colitis v. radiation colitis  -Hb dropped to 8.6 (from 10.4), no complaints, denies blood in stool or melena  -s/p colonoscopy showing "Congested, ulcerated, friable mucosa in the recto-sigmoid colon from 5-10 cm to 30 cm from the anal verge with relative rectal sparing. Findings suspicious for ischemic colitis vs radiation colitis. Sigmoid colon diverticulosis."   -Patient started empirically on PO Cipro/Flagyl for ischemic colitis  -c/w Protonix 40mg BID  -c/w mesalamine enema at night  -monitor CBC  -outpatient GI f/u    #Metastatic Prostate CA  -stable, continue with home Prednisone    #Leukocytosis  -likely 2/2 steroid use    #LLE Lymphedema  -chronic, stable, will elevate extremity as tolerated    #VTE ppx - SCDs 2/2 GIB    #Dispo - will repeat CBC now
1) Hematochezia s/p colonoscopy showing friable mucosa in the sigmoid colon with relative rectal sparing. ? ischemic colitis vs less likely radiation proctitis.    2) Metastatic prostate cancer with mets with bone and liver s/p chemo and RT

## 2017-05-17 NOTE — DISCHARGE NOTE ADULT - HOSPITAL COURSE
H&P:  60 yo M w/ hx of HTN, metastatic prostate cancer ([address] at diagnosis, liver metastases TNM stage: T3b, N1, M1 AJCC Stage: IV; also mets to spine) presenting with 1 day of bright red blood per rectum. Reports that he had received chemotherapy on 5/8/17, reports tolerated it well, developed diarrhea on Saturday (normal brown stools), had 2-3x episodes during the day, but later developed diarrhea at around 9:30 PM; reports that he noticed blood on the toilet paper only when wiped, but his wife reported that there was "blood all over the toilet bowl" (patient reports he did not see it. Reports that he normally gets diarrhea after chemotherapy; had tried taking imodium before the blood appeared. Reports he felt like he was straining particularly hard prior to onset of blood. Patient not currently taking anticoagulation; had received radiation therapy for his spinal and hip mets; denies any previous hx of significant GI bleeds. Does not know if he has hemorrhoids, but does endorse history of blood on toilet when he "wipes hard enough. Denies abdominal pain, nausea, vomiting, hx of ulcers; had colonoscopy approximately 4-5 years ago that was "normal."     During interview, patient had an episode where he had water stools; in toilet, small amount of blood noted. On exam, external nonbleeding hemorrhoids noted, nontender to touch; internal hemorrhoids palpated, no gross blood on digital rectal exam. Occult blood noted to be positive on admission labs.    Hospital Course: H&P:  60 yo M w/ hx of HTN, metastatic prostate cancer ([address] 9 at diagnosis, liver metastases TNM stage: T3b, N1, M1 AJCC Stage: IV; also mets to spine) presenting with 1 day of bright red blood per rectum. Reports that he had received chemotherapy on 5/8/17, reports tolerated it well, developed diarrhea on Saturday (normal brown stools), had 2-3x episodes during the day, but later developed diarrhea at around 9:30 PM; reports that he noticed blood on the toilet paper only when wiped, but his wife reported that there was "blood all over the toilet bowl" (patient reports he did not see it. Reports that he normally gets diarrhea after chemotherapy; had tried taking imodium before the blood appeared. Reports he felt like he was straining particularly hard prior to onset of blood. Patient not currently taking anticoagulation; had received radiation therapy for his spinal and hip mets; denies any previous hx of significant GI bleeds. Does not know if he has hemorrhoids, but does endorse history of blood on toilet when he "wipes hard enough. Denies abdominal pain, nausea, vomiting, hx of ulcers; had colonoscopy approximately 4-5 years ago that was "normal."     During interview, patient had an episode where he had water stools; in toilet, small amount of blood noted. On exam, external nonbleeding hemorrhoids noted, nontender to touch; internal hemorrhoids palpated, no gross blood on digital rectal exam. Occult blood noted to be positive on admission labs.    In ED: UA was positive for blood, no concern for UTI (Urine culture negative)    Hospital Course:    Patient was admitted to medicine 5/12/17 for multiple episodes of hematochezia at home in setting of recent chemotherapy/radiation 5/8/17. Patient's ED hemoglobin was found to be 10.0 and the patient remained hemodynamically stable. GI was consulted and plan was for Colonoscopy 5/16/17. Patient was initially NPO, but tolerated a clear liquid diet over the weekend prior to his colonoscopy. Protonix 40mg IV BID was started empirically. Hemoglobin remained stable throughout the hospitalization.   (((Hb 10.0 - 9.9 - 9.3 - 8.9 - 9.0 - 8.9 - 9.6 - 10.4 - 9.4)))  Patient was seen by Oncology during hospitalization whom recommended no chemotherapy inpatient and to follow-up at regular scheduled appointment upon discharge. His Prednisone 5mg was re-started at the request of Oncology at that time.    Patient received a colonoscopy on 5/16/17 showing "Congested, ulcerated, friable mucosa in the recto-sigmoid colon from 5-10 cm to 30 cm from the anal verge with relative rectal sparing. Findings suspicious for ischemic colitis vs radiation colitis. Diverticulosis in the sigmoid colon." Recommendations per GI were to start empiric antibiotics for ischemic colitis (Ciprofloxacin/Flagyl), Daily oral PPI and follow-up with Dr. Solo Mendes as outpatient for further issues.    Patient was discharged home on 5/17/17 with a prescription for Ciprofloxacin/Flagyl (10d course) and Oncology and GI follow-up as outpatient.
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
## 2017-05-22 PROBLEM — K92.1 HEMATOCHEZIA: Status: ACTIVE | Noted: 2017-05-22

## 2017-05-24 ENCOUNTER — RESULT REVIEW (OUTPATIENT)
Age: 62
End: 2017-05-24

## 2017-05-24 ENCOUNTER — APPOINTMENT (OUTPATIENT)
Dept: HEMATOLOGY ONCOLOGY | Facility: CLINIC | Age: 62
End: 2017-05-24

## 2017-05-24 VITALS
DIASTOLIC BLOOD PRESSURE: 70 MMHG | BODY MASS INDEX: 28.76 KG/M2 | RESPIRATION RATE: 16 BRPM | OXYGEN SATURATION: 98 % | SYSTOLIC BLOOD PRESSURE: 120 MMHG | WEIGHT: 167.55 LBS | HEART RATE: 77 BPM | TEMPERATURE: 97.9 F

## 2017-05-24 DIAGNOSIS — K92.1 MELENA: ICD-10-CM

## 2017-05-24 LAB
HCT VFR BLD CALC: 32.8 % — LOW (ref 39–50)
HGB BLD-MCNC: 10.8 G/DL — LOW (ref 13–17)
MCHC RBC-ENTMCNC: 29.9 PG — SIGNIFICANT CHANGE UP (ref 27–34)
MCHC RBC-ENTMCNC: 32.9 G/DL — SIGNIFICANT CHANGE UP (ref 32–36)
MCV RBC AUTO: 91 FL — SIGNIFICANT CHANGE UP (ref 80–100)
PLATELET # BLD AUTO: 220 K/UL — SIGNIFICANT CHANGE UP (ref 150–400)
RBC # BLD: 3.61 M/UL — LOW (ref 4.2–5.8)
RBC # FLD: 17.8 % — HIGH (ref 10.3–14.5)
WBC # BLD: 10.7 K/UL — HIGH (ref 3.8–10.5)
WBC # FLD AUTO: 10.7 K/UL — HIGH (ref 3.8–10.5)

## 2017-05-24 RX ORDER — PANTOPRAZOLE 40 MG/1
40 TABLET, DELAYED RELEASE ORAL
Refills: 0 | Status: ACTIVE | COMMUNITY

## 2017-05-24 RX ORDER — LEUPROLIDE ACETATE 45 MG
KIT INTRAMUSCULAR
Refills: 0 | Status: DISCONTINUED | COMMUNITY
End: 2017-05-24

## 2017-05-24 RX ORDER — OXYCODONE HYDROCHLORIDE 10 MG/1
10 TABLET, FILM COATED, EXTENDED RELEASE ORAL
Qty: 60 | Refills: 0 | Status: DISCONTINUED | COMMUNITY
Start: 2017-03-21 | End: 2017-05-24

## 2017-05-31 ENCOUNTER — RESULT REVIEW (OUTPATIENT)
Age: 62
End: 2017-05-31

## 2017-05-31 ENCOUNTER — APPOINTMENT (OUTPATIENT)
Dept: INFUSION THERAPY | Facility: HOSPITAL | Age: 62
End: 2017-05-31

## 2017-05-31 LAB
HCT VFR BLD CALC: 34 % — LOW (ref 39–50)
HGB BLD-MCNC: 11.2 G/DL — LOW (ref 13–17)
MCHC RBC-ENTMCNC: 30 PG — SIGNIFICANT CHANGE UP (ref 27–34)
MCHC RBC-ENTMCNC: 33.1 G/DL — SIGNIFICANT CHANGE UP (ref 32–36)
MCV RBC AUTO: 90.6 FL — SIGNIFICANT CHANGE UP (ref 80–100)
PLATELET # BLD AUTO: 269 K/UL — SIGNIFICANT CHANGE UP (ref 150–400)
RBC # BLD: 3.75 M/UL — LOW (ref 4.2–5.8)
RBC # FLD: 17.1 % — HIGH (ref 10.3–14.5)
WBC # BLD: 8.1 K/UL — SIGNIFICANT CHANGE UP (ref 3.8–10.5)
WBC # FLD AUTO: 8.1 K/UL — SIGNIFICANT CHANGE UP (ref 3.8–10.5)

## 2017-06-01 ENCOUNTER — APPOINTMENT (OUTPATIENT)
Dept: INFUSION THERAPY | Facility: HOSPITAL | Age: 62
End: 2017-06-01

## 2017-06-01 ENCOUNTER — APPOINTMENT (OUTPATIENT)
Dept: RADIATION ONCOLOGY | Facility: CLINIC | Age: 62
End: 2017-06-01

## 2017-06-01 VITALS
OXYGEN SATURATION: 100 % | HEIGHT: 64 IN | BODY MASS INDEX: 29.62 KG/M2 | SYSTOLIC BLOOD PRESSURE: 154 MMHG | RESPIRATION RATE: 16 BRPM | DIASTOLIC BLOOD PRESSURE: 86 MMHG | WEIGHT: 173.5 LBS | HEART RATE: 69 BPM | TEMPERATURE: 98.2 F

## 2017-06-01 DIAGNOSIS — Z51.11 ENCOUNTER FOR ANTINEOPLASTIC CHEMOTHERAPY: ICD-10-CM

## 2017-06-01 DIAGNOSIS — R11.2 NAUSEA WITH VOMITING, UNSPECIFIED: ICD-10-CM

## 2017-06-01 NOTE — DISCHARGE NOTE ADULT - PATIENT PORTAL LINK FT
“You can access the Follow[facility] Patient Portal, offered by [facility], by registering with the following website: [email or web address]”
[employer], [facility]

## 2017-06-07 LAB — PSA SERPL-MCNC: 84.6 NG/ML

## 2017-06-08 PROBLEM — K55.9 ISCHEMIC COLITIS: Status: ACTIVE | Noted: 2017-06-08

## 2017-06-08 LAB
ALBUMIN SERPL ELPH-MCNC: 3.6 G/DL
ALP BLD-CCNC: 130 U/L
ALT SERPL-CCNC: 19 U/L
ANION GAP SERPL CALC-SCNC: 13 MMOL/L
AST SERPL-CCNC: 16 U/L
BILIRUB SERPL-MCNC: 0.3 MG/DL
BUN SERPL-MCNC: 17 MG/DL
CALCIUM SERPL-MCNC: 9 MG/DL
CHLORIDE SERPL-SCNC: 111 MMOL/L
CO2 SERPL-SCNC: 24 MMOL/L
CREAT SERPL-MCNC: 1 MG/DL
GLUCOSE SERPL-MCNC: 97 MG/DL
LDH SERPL-CCNC: 185 U/L
POTASSIUM SERPL-SCNC: 3.9 MMOL/L
PROT SERPL-MCNC: 5 G/DL
SODIUM SERPL-SCNC: 148 MMOL/L

## 2017-06-13 ENCOUNTER — OUTPATIENT (OUTPATIENT)
Dept: OUTPATIENT SERVICES | Facility: HOSPITAL | Age: 62
LOS: 1 days | Discharge: HOME | End: 2017-06-13

## 2017-06-13 DIAGNOSIS — C61 MALIGNANT NEOPLASM OF PROSTATE: ICD-10-CM

## 2017-06-14 ENCOUNTER — RESULT REVIEW (OUTPATIENT)
Age: 62
End: 2017-06-14

## 2017-06-14 ENCOUNTER — APPOINTMENT (OUTPATIENT)
Dept: HEMATOLOGY ONCOLOGY | Facility: CLINIC | Age: 62
End: 2017-06-14

## 2017-06-14 VITALS
HEART RATE: 78 BPM | BODY MASS INDEX: 27.28 KG/M2 | WEIGHT: 158.95 LBS | TEMPERATURE: 98 F | RESPIRATION RATE: 16 BRPM | OXYGEN SATURATION: 100 % | DIASTOLIC BLOOD PRESSURE: 83 MMHG | SYSTOLIC BLOOD PRESSURE: 156 MMHG

## 2017-06-14 DIAGNOSIS — Z00.00 ENCOUNTER FOR GENERAL ADULT MEDICAL EXAMINATION W/OUT ABNORMAL FINDINGS: ICD-10-CM

## 2017-06-14 DIAGNOSIS — K55.9 VASCULAR DISORDER OF INTESTINE, UNSPECIFIED: ICD-10-CM

## 2017-06-14 LAB
HCT VFR BLD CALC: 36 % — LOW (ref 39–50)
HGB BLD-MCNC: 11.7 G/DL — LOW (ref 13–17)
MCHC RBC-ENTMCNC: 29.8 PG — SIGNIFICANT CHANGE UP (ref 27–34)
MCHC RBC-ENTMCNC: 32.5 G/DL — SIGNIFICANT CHANGE UP (ref 32–36)
MCV RBC AUTO: 91.9 FL — SIGNIFICANT CHANGE UP (ref 80–100)
PLATELET # BLD AUTO: 283 K/UL — SIGNIFICANT CHANGE UP (ref 150–400)
RBC # BLD: 3.91 M/UL — LOW (ref 4.2–5.8)
RBC # FLD: 16.4 % — HIGH (ref 10.3–14.5)
WBC # BLD: 15.7 K/UL — HIGH (ref 3.8–10.5)
WBC # FLD AUTO: 15.7 K/UL — HIGH (ref 3.8–10.5)

## 2017-06-19 ENCOUNTER — RESULT REVIEW (OUTPATIENT)
Age: 62
End: 2017-06-19

## 2017-06-19 ENCOUNTER — APPOINTMENT (OUTPATIENT)
Dept: INFUSION THERAPY | Facility: HOSPITAL | Age: 62
End: 2017-06-19

## 2017-06-19 LAB
HCT VFR BLD CALC: 34.7 % — LOW (ref 39–50)
HGB BLD-MCNC: 11.7 G/DL — LOW (ref 13–17)
MCHC RBC-ENTMCNC: 30.6 PG — SIGNIFICANT CHANGE UP (ref 27–34)
MCHC RBC-ENTMCNC: 33.7 G/DL — SIGNIFICANT CHANGE UP (ref 32–36)
MCV RBC AUTO: 90.9 FL — SIGNIFICANT CHANGE UP (ref 80–100)
PLATELET # BLD AUTO: 251 K/UL — SIGNIFICANT CHANGE UP (ref 150–400)
RBC # BLD: 3.82 M/UL — LOW (ref 4.2–5.8)
RBC # FLD: 15.8 % — HIGH (ref 10.3–14.5)
WBC # BLD: 13.5 K/UL — HIGH (ref 3.8–10.5)
WBC # FLD AUTO: 13.5 K/UL — HIGH (ref 3.8–10.5)

## 2017-06-20 ENCOUNTER — APPOINTMENT (OUTPATIENT)
Dept: UROLOGY | Facility: CLINIC | Age: 62
End: 2017-06-20

## 2017-06-20 ENCOUNTER — APPOINTMENT (OUTPATIENT)
Dept: INFUSION THERAPY | Facility: HOSPITAL | Age: 62
End: 2017-06-20

## 2017-06-21 ENCOUNTER — APPOINTMENT (OUTPATIENT)
Dept: INFUSION THERAPY | Facility: HOSPITAL | Age: 62
End: 2017-06-21

## 2017-06-21 DIAGNOSIS — Z51.89 ENCOUNTER FOR OTHER SPECIFIED AFTERCARE: ICD-10-CM

## 2017-06-26 LAB
ALBUMIN SERPL ELPH-MCNC: 3.7 G/DL
ALP BLD-CCNC: 122 U/L
ALT SERPL-CCNC: 16 U/L
ANION GAP SERPL CALC-SCNC: 15 MMOL/L
AST SERPL-CCNC: 17 U/L
BILIRUB SERPL-MCNC: 0.4 MG/DL
BUN SERPL-MCNC: 13 MG/DL
CALCIUM SERPL-MCNC: 9.4 MG/DL
CHLORIDE SERPL-SCNC: 107 MMOL/L
CO2 SERPL-SCNC: 24 MMOL/L
CREAT SERPL-MCNC: 0.88 MG/DL
GLUCOSE SERPL-MCNC: 85 MG/DL
LDH SERPL-CCNC: 248 U/L
POTASSIUM SERPL-SCNC: 5 MMOL/L
PROT SERPL-MCNC: 5.5 G/DL
PSA SERPL-MCNC: 60.97 NG/ML
SODIUM SERPL-SCNC: 146 MMOL/L

## 2017-07-10 ENCOUNTER — RESULT REVIEW (OUTPATIENT)
Age: 62
End: 2017-07-10

## 2017-07-10 ENCOUNTER — APPOINTMENT (OUTPATIENT)
Dept: INFUSION THERAPY | Facility: HOSPITAL | Age: 62
End: 2017-07-10

## 2017-07-10 LAB
HCT VFR BLD CALC: 33.7 % — LOW (ref 39–50)
HGB BLD-MCNC: 11.3 G/DL — LOW (ref 13–17)
MCHC RBC-ENTMCNC: 30.7 PG — SIGNIFICANT CHANGE UP (ref 27–34)
MCHC RBC-ENTMCNC: 33.6 G/DL — SIGNIFICANT CHANGE UP (ref 32–36)
MCV RBC AUTO: 91.3 FL — SIGNIFICANT CHANGE UP (ref 80–100)
PLATELET # BLD AUTO: 429 K/UL — HIGH (ref 150–400)
RBC # BLD: 3.69 M/UL — LOW (ref 4.2–5.8)
RBC # FLD: 14.4 % — SIGNIFICANT CHANGE UP (ref 10.3–14.5)
WBC # BLD: 14.6 K/UL — HIGH (ref 3.8–10.5)
WBC # FLD AUTO: 14.6 K/UL — HIGH (ref 3.8–10.5)

## 2017-07-11 ENCOUNTER — APPOINTMENT (OUTPATIENT)
Dept: INFUSION THERAPY | Facility: HOSPITAL | Age: 62
End: 2017-07-11

## 2017-07-11 ENCOUNTER — APPOINTMENT (OUTPATIENT)
Dept: UROLOGY | Facility: CLINIC | Age: 62
End: 2017-07-11

## 2017-07-11 DIAGNOSIS — Z51.11 ENCOUNTER FOR ANTINEOPLASTIC CHEMOTHERAPY: ICD-10-CM

## 2017-07-11 DIAGNOSIS — D41.4 NEOPLASM OF UNCERTAIN BEHAVIOR OF BLADDER: ICD-10-CM

## 2017-07-11 DIAGNOSIS — C78.7 SECONDARY MALIGNANT NEOPLASM OF LIVER AND INTRAHEPATIC BILE DUCT: ICD-10-CM

## 2017-07-11 DIAGNOSIS — D70.1 AGRANULOCYTOSIS SECONDARY TO CANCER CHEMOTHERAPY: ICD-10-CM

## 2017-07-11 DIAGNOSIS — R11.2 NAUSEA WITH VOMITING, UNSPECIFIED: ICD-10-CM

## 2017-07-14 ENCOUNTER — OUTPATIENT (OUTPATIENT)
Dept: OUTPATIENT SERVICES | Facility: HOSPITAL | Age: 62
LOS: 1 days | Discharge: HOME | End: 2017-07-14

## 2017-07-14 DIAGNOSIS — C61 MALIGNANT NEOPLASM OF PROSTATE: ICD-10-CM

## 2017-07-19 ENCOUNTER — APPOINTMENT (OUTPATIENT)
Dept: HEMATOLOGY ONCOLOGY | Facility: CLINIC | Age: 62
End: 2017-07-19

## 2017-07-19 ENCOUNTER — RESULT REVIEW (OUTPATIENT)
Age: 62
End: 2017-07-19

## 2017-07-19 VITALS
WEIGHT: 152.12 LBS | TEMPERATURE: 98.3 F | HEART RATE: 82 BPM | BODY MASS INDEX: 26.11 KG/M2 | SYSTOLIC BLOOD PRESSURE: 140 MMHG | DIASTOLIC BLOOD PRESSURE: 80 MMHG | RESPIRATION RATE: 16 BRPM | OXYGEN SATURATION: 98 %

## 2017-07-19 LAB
HCT VFR BLD CALC: 31.7 % — LOW (ref 39–50)
HGB BLD-MCNC: 10.4 G/DL — LOW (ref 13–17)
MCHC RBC-ENTMCNC: 29.9 PG — SIGNIFICANT CHANGE UP (ref 27–34)
MCHC RBC-ENTMCNC: 32.8 G/DL — SIGNIFICANT CHANGE UP (ref 32–36)
MCV RBC AUTO: 91.2 FL — SIGNIFICANT CHANGE UP (ref 80–100)
PLATELET # BLD AUTO: 255 K/UL — SIGNIFICANT CHANGE UP (ref 150–400)
RBC # BLD: 3.47 M/UL — LOW (ref 4.2–5.8)
RBC # FLD: 14.3 % — SIGNIFICANT CHANGE UP (ref 10.3–14.5)
WBC # BLD: 11.1 K/UL — HIGH (ref 3.8–10.5)
WBC # FLD AUTO: 11.1 K/UL — HIGH (ref 3.8–10.5)

## 2017-07-19 RX ORDER — METRONIDAZOLE 500 MG/1
500 TABLET ORAL
Refills: 0 | Status: DISCONTINUED | COMMUNITY
End: 2017-07-19

## 2017-07-19 RX ORDER — OXYCODONE 5 MG/1
5 TABLET ORAL
Qty: 100 | Refills: 0 | Status: DISCONTINUED | COMMUNITY
Start: 2017-03-01 | End: 2017-07-19

## 2017-07-19 RX ORDER — AMLODIPINE BESYLATE AND VALSARTAN 10; 160 MG/1; MG/1
10-160 TABLET, FILM COATED ORAL
Refills: 0 | Status: ACTIVE | COMMUNITY

## 2017-07-19 RX ORDER — CIPROFLOXACIN HYDROCHLORIDE 250 MG/1
250 TABLET, FILM COATED ORAL
Refills: 0 | Status: DISCONTINUED | COMMUNITY
End: 2017-07-19

## 2017-07-31 ENCOUNTER — RESULT REVIEW (OUTPATIENT)
Age: 62
End: 2017-07-31

## 2017-07-31 ENCOUNTER — APPOINTMENT (OUTPATIENT)
Dept: INFUSION THERAPY | Facility: HOSPITAL | Age: 62
End: 2017-07-31

## 2017-07-31 LAB
HCT VFR BLD CALC: 31.1 % — LOW (ref 39–50)
HGB BLD-MCNC: 10.7 G/DL — LOW (ref 13–17)
MCHC RBC-ENTMCNC: 31 PG — SIGNIFICANT CHANGE UP (ref 27–34)
MCHC RBC-ENTMCNC: 34.3 G/DL — SIGNIFICANT CHANGE UP (ref 32–36)
MCV RBC AUTO: 90.5 FL — SIGNIFICANT CHANGE UP (ref 80–100)
PLATELET # BLD AUTO: 223 K/UL — SIGNIFICANT CHANGE UP (ref 150–400)
RBC # BLD: 3.43 M/UL — LOW (ref 4.2–5.8)
RBC # FLD: 14.7 % — HIGH (ref 10.3–14.5)
WBC # BLD: 13.5 K/UL — HIGH (ref 3.8–10.5)
WBC # FLD AUTO: 13.5 K/UL — HIGH (ref 3.8–10.5)

## 2017-08-01 ENCOUNTER — APPOINTMENT (OUTPATIENT)
Dept: INFUSION THERAPY | Facility: HOSPITAL | Age: 62
End: 2017-08-01

## 2017-08-01 DIAGNOSIS — Z51.11 ENCOUNTER FOR ANTINEOPLASTIC CHEMOTHERAPY: ICD-10-CM

## 2017-08-01 DIAGNOSIS — R11.2 NAUSEA WITH VOMITING, UNSPECIFIED: ICD-10-CM

## 2017-08-02 ENCOUNTER — OUTPATIENT (OUTPATIENT)
Dept: OUTPATIENT SERVICES | Facility: HOSPITAL | Age: 62
LOS: 1 days | End: 2017-08-02
Payer: COMMERCIAL

## 2017-08-02 ENCOUNTER — APPOINTMENT (OUTPATIENT)
Dept: NUCLEAR MEDICINE | Facility: IMAGING CENTER | Age: 62
End: 2017-08-02
Payer: COMMERCIAL

## 2017-08-02 ENCOUNTER — APPOINTMENT (OUTPATIENT)
Dept: CT IMAGING | Facility: IMAGING CENTER | Age: 62
End: 2017-08-02
Payer: COMMERCIAL

## 2017-08-02 DIAGNOSIS — Z51.89 ENCOUNTER FOR OTHER SPECIFIED AFTERCARE: ICD-10-CM

## 2017-08-02 DIAGNOSIS — C61 MALIGNANT NEOPLASM OF PROSTATE: ICD-10-CM

## 2017-08-02 PROCEDURE — 78306 BONE IMAGING WHOLE BODY: CPT | Mod: 26

## 2017-08-02 PROCEDURE — A9561: CPT

## 2017-08-02 PROCEDURE — 74177 CT ABD & PELVIS W/CONTRAST: CPT | Mod: 26

## 2017-08-02 PROCEDURE — 74177 CT ABD & PELVIS W/CONTRAST: CPT

## 2017-08-02 PROCEDURE — 78306 BONE IMAGING WHOLE BODY: CPT

## 2017-08-04 ENCOUNTER — TRANSCRIPTION ENCOUNTER (OUTPATIENT)
Age: 62
End: 2017-08-04

## 2017-08-07 DIAGNOSIS — K76.9 LIVER DISEASE, UNSPECIFIED: ICD-10-CM

## 2017-08-07 DIAGNOSIS — R93.7 ABNORMAL FINDINGS ON DIAGNOSTIC IMAGING OF OTHER PARTS OF MUSCULOSKELETAL SYSTEM: ICD-10-CM

## 2017-08-07 DIAGNOSIS — C61 MALIGNANT NEOPLASM OF PROSTATE: ICD-10-CM

## 2017-08-07 DIAGNOSIS — C79.51 SECONDARY MALIGNANT NEOPLASM OF BONE: ICD-10-CM

## 2017-08-08 LAB
ALBUMIN SERPL ELPH-MCNC: 3.3 G/DL
ALP BLD-CCNC: 120 U/L
ALT SERPL-CCNC: 19 U/L
ANION GAP SERPL CALC-SCNC: 12 MMOL/L
AST SERPL-CCNC: 14 U/L
BILIRUB SERPL-MCNC: 0.2 MG/DL
BUN SERPL-MCNC: 15 MG/DL
CALCIUM SERPL-MCNC: 9.1 MG/DL
CHLORIDE SERPL-SCNC: 112 MMOL/L
CO2 SERPL-SCNC: 24 MMOL/L
CREAT SERPL-MCNC: 1.04 MG/DL
GLUCOSE SERPL-MCNC: 88 MG/DL
LDH SERPL-CCNC: 161 U/L
POTASSIUM SERPL-SCNC: 4.5 MMOL/L
PROT SERPL-MCNC: 5.2 G/DL
PSA SERPL-MCNC: 61.88 NG/ML
SODIUM SERPL-SCNC: 148 MMOL/L

## 2017-08-16 ENCOUNTER — OUTPATIENT (OUTPATIENT)
Dept: OUTPATIENT SERVICES | Facility: HOSPITAL | Age: 62
LOS: 1 days | Discharge: HOME | End: 2017-08-16

## 2017-08-16 DIAGNOSIS — C61 MALIGNANT NEOPLASM OF PROSTATE: ICD-10-CM

## 2017-08-21 ENCOUNTER — RESULT REVIEW (OUTPATIENT)
Age: 62
End: 2017-08-21

## 2017-08-21 ENCOUNTER — APPOINTMENT (OUTPATIENT)
Dept: INFUSION THERAPY | Facility: HOSPITAL | Age: 62
End: 2017-08-21

## 2017-08-21 LAB
HCT VFR BLD CALC: 35.4 % — LOW (ref 39–50)
HGB BLD-MCNC: 12 G/DL — LOW (ref 13–17)
MCHC RBC-ENTMCNC: 31 PG — SIGNIFICANT CHANGE UP (ref 27–34)
MCHC RBC-ENTMCNC: 33.9 G/DL — SIGNIFICANT CHANGE UP (ref 32–36)
MCV RBC AUTO: 91.3 FL — SIGNIFICANT CHANGE UP (ref 80–100)
PLATELET # BLD AUTO: 217 K/UL — SIGNIFICANT CHANGE UP (ref 150–400)
RBC # BLD: 3.88 M/UL — LOW (ref 4.2–5.8)
RBC # FLD: 14.6 % — HIGH (ref 10.3–14.5)
WBC # BLD: 11.6 K/UL — HIGH (ref 3.8–10.5)
WBC # FLD AUTO: 11.6 K/UL — HIGH (ref 3.8–10.5)

## 2017-08-22 ENCOUNTER — APPOINTMENT (OUTPATIENT)
Dept: INFUSION THERAPY | Facility: HOSPITAL | Age: 62
End: 2017-08-22

## 2017-08-22 DIAGNOSIS — Z51.11 ENCOUNTER FOR ANTINEOPLASTIC CHEMOTHERAPY: ICD-10-CM

## 2017-08-22 DIAGNOSIS — R11.2 NAUSEA WITH VOMITING, UNSPECIFIED: ICD-10-CM

## 2017-08-23 ENCOUNTER — MEDICATION RENEWAL (OUTPATIENT)
Age: 62
End: 2017-08-23

## 2017-08-23 DIAGNOSIS — Z51.89 ENCOUNTER FOR OTHER SPECIFIED AFTERCARE: ICD-10-CM

## 2017-09-06 ENCOUNTER — APPOINTMENT (OUTPATIENT)
Dept: HEMATOLOGY ONCOLOGY | Facility: CLINIC | Age: 62
End: 2017-09-06
Payer: COMMERCIAL

## 2017-09-06 ENCOUNTER — RESULT REVIEW (OUTPATIENT)
Age: 62
End: 2017-09-06

## 2017-09-06 VITALS
HEART RATE: 74 BPM | TEMPERATURE: 97.7 F | WEIGHT: 163.14 LBS | BODY MASS INDEX: 28 KG/M2 | DIASTOLIC BLOOD PRESSURE: 80 MMHG | SYSTOLIC BLOOD PRESSURE: 130 MMHG | OXYGEN SATURATION: 99 % | RESPIRATION RATE: 16 BRPM

## 2017-09-06 DIAGNOSIS — D64.81 ANEMIA DUE TO ANTINEOPLASTIC CHEMOTHERAPY: ICD-10-CM

## 2017-09-06 DIAGNOSIS — C78.7 SECONDARY MALIGNANT NEOPLASM OF LIVER AND INTRAHEPATIC BILE DUCT: ICD-10-CM

## 2017-09-06 LAB
HCT VFR BLD CALC: 33.4 % — LOW (ref 39–50)
HGB BLD-MCNC: 11.2 G/DL — LOW (ref 13–17)
MCHC RBC-ENTMCNC: 31 PG — SIGNIFICANT CHANGE UP (ref 27–34)
MCHC RBC-ENTMCNC: 33.4 G/DL — SIGNIFICANT CHANGE UP (ref 32–36)
MCV RBC AUTO: 92.7 FL — SIGNIFICANT CHANGE UP (ref 80–100)
PLATELET # BLD AUTO: 216 K/UL — SIGNIFICANT CHANGE UP (ref 150–400)
RBC # BLD: 3.6 M/UL — LOW (ref 4.2–5.8)
RBC # FLD: 14.9 % — HIGH (ref 10.3–14.5)
WBC # BLD: 17.5 K/UL — HIGH (ref 3.8–10.5)
WBC # FLD AUTO: 17.5 K/UL — HIGH (ref 3.8–10.5)

## 2017-09-06 PROCEDURE — 99214 OFFICE O/P EST MOD 30 MIN: CPT

## 2017-09-07 LAB
ALBUMIN SERPL ELPH-MCNC: 3.7 G/DL
ALP BLD-CCNC: 128 U/L
ALT SERPL-CCNC: 16 U/L
ANION GAP SERPL CALC-SCNC: 17 MMOL/L
AST SERPL-CCNC: 15 U/L
BILIRUB SERPL-MCNC: 0.3 MG/DL
BUN SERPL-MCNC: 15 MG/DL
CALCIUM SERPL-MCNC: 8.4 MG/DL
CHLORIDE SERPL-SCNC: 109 MMOL/L
CO2 SERPL-SCNC: 23 MMOL/L
CREAT SERPL-MCNC: 0.82 MG/DL
GLUCOSE SERPL-MCNC: 91 MG/DL
LDH SERPL-CCNC: 218 U/L
POTASSIUM SERPL-SCNC: 4 MMOL/L
PROT SERPL-MCNC: 5.5 G/DL
PSA SERPL-MCNC: 39.5 NG/ML
SODIUM SERPL-SCNC: 149 MMOL/L

## 2017-09-13 ENCOUNTER — RESULT REVIEW (OUTPATIENT)
Age: 62
End: 2017-09-13

## 2017-09-13 ENCOUNTER — APPOINTMENT (OUTPATIENT)
Dept: INFUSION THERAPY | Facility: HOSPITAL | Age: 62
End: 2017-09-13

## 2017-09-13 LAB
HCT VFR BLD CALC: 33.9 % — LOW (ref 39–50)
HGB BLD-MCNC: 11.4 G/DL — LOW (ref 13–17)
MCHC RBC-ENTMCNC: 30.4 PG — SIGNIFICANT CHANGE UP (ref 27–34)
MCHC RBC-ENTMCNC: 33.6 G/DL — SIGNIFICANT CHANGE UP (ref 32–36)
MCV RBC AUTO: 90.6 FL — SIGNIFICANT CHANGE UP (ref 80–100)
PLATELET # BLD AUTO: 281 K/UL — SIGNIFICANT CHANGE UP (ref 150–400)
RBC # BLD: 3.74 M/UL — LOW (ref 4.2–5.8)
RBC # FLD: 14 % — SIGNIFICANT CHANGE UP (ref 10.3–14.5)
WBC # BLD: 13.1 K/UL — HIGH (ref 3.8–10.5)
WBC # FLD AUTO: 13.1 K/UL — HIGH (ref 3.8–10.5)

## 2017-09-14 ENCOUNTER — APPOINTMENT (OUTPATIENT)
Dept: INFUSION THERAPY | Facility: HOSPITAL | Age: 62
End: 2017-09-14

## 2017-09-15 ENCOUNTER — APPOINTMENT (OUTPATIENT)
Dept: HEMATOLOGY ONCOLOGY | Facility: CLINIC | Age: 62
End: 2017-09-15

## 2017-09-15 DIAGNOSIS — T45.1X5A ANEMIA DUE TO ANTINEOPLASTIC CHEMOTHERAPY: ICD-10-CM

## 2017-09-15 DIAGNOSIS — D64.81 ANEMIA DUE TO ANTINEOPLASTIC CHEMOTHERAPY: ICD-10-CM

## 2017-09-22 ENCOUNTER — OUTPATIENT (OUTPATIENT)
Dept: OUTPATIENT SERVICES | Facility: HOSPITAL | Age: 62
LOS: 1 days | Discharge: HOME | End: 2017-09-22

## 2017-09-22 DIAGNOSIS — C61 MALIGNANT NEOPLASM OF PROSTATE: ICD-10-CM

## 2017-09-26 ENCOUNTER — RESULT REVIEW (OUTPATIENT)
Age: 62
End: 2017-09-26

## 2017-09-26 ENCOUNTER — APPOINTMENT (OUTPATIENT)
Dept: HEMATOLOGY ONCOLOGY | Facility: CLINIC | Age: 62
End: 2017-09-26
Payer: COMMERCIAL

## 2017-09-26 VITALS
OXYGEN SATURATION: 98 % | DIASTOLIC BLOOD PRESSURE: 87 MMHG | RESPIRATION RATE: 16 BRPM | WEIGHT: 155.42 LBS | BODY MASS INDEX: 26.68 KG/M2 | HEART RATE: 83 BPM | SYSTOLIC BLOOD PRESSURE: 143 MMHG | TEMPERATURE: 98.4 F

## 2017-09-26 LAB
HCT VFR BLD CALC: 36.9 % — LOW (ref 39–50)
HGB BLD-MCNC: 11.6 G/DL — LOW (ref 13–17)
MCHC RBC-ENTMCNC: 29.3 PG — SIGNIFICANT CHANGE UP (ref 27–34)
MCHC RBC-ENTMCNC: 31.5 G/DL — LOW (ref 32–36)
MCV RBC AUTO: 93.2 FL — SIGNIFICANT CHANGE UP (ref 80–100)
PLATELET # BLD AUTO: 294 K/UL — SIGNIFICANT CHANGE UP (ref 150–400)
RBC # BLD: 3.96 M/UL — LOW (ref 4.2–5.8)
RBC # FLD: 14.5 % — SIGNIFICANT CHANGE UP (ref 10.3–14.5)
WBC # BLD: 20.7 K/UL — HIGH (ref 3.8–10.5)
WBC # FLD AUTO: 20.7 K/UL — HIGH (ref 3.8–10.5)

## 2017-09-26 PROCEDURE — 99214 OFFICE O/P EST MOD 30 MIN: CPT

## 2017-09-27 LAB
ALBUMIN SERPL ELPH-MCNC: 3.8 G/DL
ALP BLD-CCNC: 163 U/L
ALT SERPL-CCNC: 11 U/L
ANION GAP SERPL CALC-SCNC: 12 MMOL/L
AST SERPL-CCNC: 16 U/L
BILIRUB SERPL-MCNC: 0.3 MG/DL
BUN SERPL-MCNC: 12 MG/DL
CALCIUM SERPL-MCNC: 9.4 MG/DL
CHLORIDE SERPL-SCNC: 107 MMOL/L
CO2 SERPL-SCNC: 26 MMOL/L
CREAT SERPL-MCNC: 0.92 MG/DL
GLUCOSE SERPL-MCNC: 100 MG/DL
LDH SERPL-CCNC: 255 U/L
POTASSIUM SERPL-SCNC: 5 MMOL/L
PROT SERPL-MCNC: 5.9 G/DL
PSA SERPL-MCNC: 56.48 NG/ML
SODIUM SERPL-SCNC: 145 MMOL/L

## 2017-10-04 ENCOUNTER — RESULT REVIEW (OUTPATIENT)
Age: 62
End: 2017-10-04

## 2017-10-04 ENCOUNTER — APPOINTMENT (OUTPATIENT)
Dept: INFUSION THERAPY | Facility: HOSPITAL | Age: 62
End: 2017-10-04

## 2017-10-04 LAB
HCT VFR BLD CALC: 35.5 % — LOW (ref 39–50)
HGB BLD-MCNC: 11.6 G/DL — LOW (ref 13–17)
MCHC RBC-ENTMCNC: 30.1 PG — SIGNIFICANT CHANGE UP (ref 27–34)
MCHC RBC-ENTMCNC: 32.6 G/DL — SIGNIFICANT CHANGE UP (ref 32–36)
MCV RBC AUTO: 92.3 FL — SIGNIFICANT CHANGE UP (ref 80–100)
PLATELET # BLD AUTO: 276 K/UL — SIGNIFICANT CHANGE UP (ref 150–400)
RBC # BLD: 3.85 M/UL — LOW (ref 4.2–5.8)
RBC # FLD: 14.2 % — SIGNIFICANT CHANGE UP (ref 10.3–14.5)
WBC # BLD: 17 K/UL — HIGH (ref 3.8–10.5)
WBC # FLD AUTO: 17 K/UL — HIGH (ref 3.8–10.5)

## 2017-10-05 ENCOUNTER — APPOINTMENT (OUTPATIENT)
Dept: INFUSION THERAPY | Facility: HOSPITAL | Age: 62
End: 2017-10-05

## 2017-10-05 DIAGNOSIS — Z51.11 ENCOUNTER FOR ANTINEOPLASTIC CHEMOTHERAPY: ICD-10-CM

## 2017-10-05 DIAGNOSIS — R11.2 NAUSEA WITH VOMITING, UNSPECIFIED: ICD-10-CM

## 2017-10-09 DIAGNOSIS — Z51.89 ENCOUNTER FOR OTHER SPECIFIED AFTERCARE: ICD-10-CM

## 2017-10-10 ENCOUNTER — OTHER (OUTPATIENT)
Age: 62
End: 2017-10-10

## 2017-10-19 ENCOUNTER — OUTPATIENT (OUTPATIENT)
Dept: OUTPATIENT SERVICES | Facility: HOSPITAL | Age: 62
LOS: 1 days | Discharge: HOME | End: 2017-10-19

## 2017-10-19 DIAGNOSIS — C61 MALIGNANT NEOPLASM OF PROSTATE: ICD-10-CM

## 2017-10-24 ENCOUNTER — RESULT REVIEW (OUTPATIENT)
Age: 62
End: 2017-10-24

## 2017-10-24 ENCOUNTER — APPOINTMENT (OUTPATIENT)
Dept: HEMATOLOGY ONCOLOGY | Facility: CLINIC | Age: 62
End: 2017-10-24
Payer: COMMERCIAL

## 2017-10-24 VITALS
DIASTOLIC BLOOD PRESSURE: 70 MMHG | TEMPERATURE: 98.6 F | BODY MASS INDEX: 27.62 KG/M2 | RESPIRATION RATE: 16 BRPM | WEIGHT: 160.94 LBS | OXYGEN SATURATION: 98 % | HEART RATE: 72 BPM | SYSTOLIC BLOOD PRESSURE: 120 MMHG

## 2017-10-24 LAB
HCT VFR BLD CALC: 34.2 % — LOW (ref 39–50)
HGB BLD-MCNC: 11.2 G/DL — LOW (ref 13–17)
MCHC RBC-ENTMCNC: 30.8 PG — SIGNIFICANT CHANGE UP (ref 27–34)
MCHC RBC-ENTMCNC: 32.9 G/DL — SIGNIFICANT CHANGE UP (ref 32–36)
MCV RBC AUTO: 93.7 FL — SIGNIFICANT CHANGE UP (ref 80–100)
PLATELET # BLD AUTO: 239 K/UL — SIGNIFICANT CHANGE UP (ref 150–400)
RBC # BLD: 3.65 M/UL — LOW (ref 4.2–5.8)
RBC # FLD: 13.9 % — SIGNIFICANT CHANGE UP (ref 10.3–14.5)
WBC # BLD: 16.1 K/UL — HIGH (ref 3.8–10.5)
WBC # FLD AUTO: 16.1 K/UL — HIGH (ref 3.8–10.5)

## 2017-10-24 PROCEDURE — 99214 OFFICE O/P EST MOD 30 MIN: CPT

## 2017-10-25 LAB
ALBUMIN SERPL ELPH-MCNC: 3.4 G/DL
ALP BLD-CCNC: 115 U/L
ALT SERPL-CCNC: 17 U/L
ANION GAP SERPL CALC-SCNC: 13 MMOL/L
AST SERPL-CCNC: 17 U/L
BILIRUB SERPL-MCNC: 0.3 MG/DL
BUN SERPL-MCNC: 15 MG/DL
CALCIUM SERPL-MCNC: 8.7 MG/DL
CHLORIDE SERPL-SCNC: 109 MMOL/L
CO2 SERPL-SCNC: 25 MMOL/L
CREAT SERPL-MCNC: 0.73 MG/DL
GLUCOSE SERPL-MCNC: 85 MG/DL
LDH SERPL-CCNC: 306 U/L
POTASSIUM SERPL-SCNC: 4.4 MMOL/L
PROT SERPL-MCNC: 5.5 G/DL
PSA SERPL-MCNC: 61.83 NG/ML
SODIUM SERPL-SCNC: 147 MMOL/L

## 2017-11-01 ENCOUNTER — APPOINTMENT (OUTPATIENT)
Dept: INFUSION THERAPY | Facility: HOSPITAL | Age: 62
End: 2017-11-01

## 2017-11-01 ENCOUNTER — RESULT REVIEW (OUTPATIENT)
Age: 62
End: 2017-11-01

## 2017-11-01 LAB
HCT VFR BLD CALC: 34.5 % — LOW (ref 39–50)
HGB BLD-MCNC: 11.5 G/DL — LOW (ref 13–17)
MCHC RBC-ENTMCNC: 30 PG — SIGNIFICANT CHANGE UP (ref 27–34)
MCHC RBC-ENTMCNC: 33.2 G/DL — SIGNIFICANT CHANGE UP (ref 32–36)
MCV RBC AUTO: 90.5 FL — SIGNIFICANT CHANGE UP (ref 80–100)
PLATELET # BLD AUTO: 327 K/UL — SIGNIFICANT CHANGE UP (ref 150–400)
RBC # BLD: 3.82 M/UL — LOW (ref 4.2–5.8)
RBC # FLD: 13.2 % — SIGNIFICANT CHANGE UP (ref 10.3–14.5)
WBC # BLD: 13.5 K/UL — HIGH (ref 3.8–10.5)
WBC # FLD AUTO: 13.5 K/UL — HIGH (ref 3.8–10.5)

## 2017-11-02 ENCOUNTER — APPOINTMENT (OUTPATIENT)
Dept: INFUSION THERAPY | Facility: HOSPITAL | Age: 62
End: 2017-11-02

## 2017-11-02 DIAGNOSIS — Z51.11 ENCOUNTER FOR ANTINEOPLASTIC CHEMOTHERAPY: ICD-10-CM

## 2017-11-02 DIAGNOSIS — R11.2 NAUSEA WITH VOMITING, UNSPECIFIED: ICD-10-CM

## 2017-11-03 ENCOUNTER — APPOINTMENT (OUTPATIENT)
Dept: CT IMAGING | Facility: IMAGING CENTER | Age: 62
End: 2017-11-03
Payer: COMMERCIAL

## 2017-11-03 ENCOUNTER — OUTPATIENT (OUTPATIENT)
Dept: OUTPATIENT SERVICES | Facility: HOSPITAL | Age: 62
LOS: 1 days | End: 2017-11-03
Payer: COMMERCIAL

## 2017-11-03 DIAGNOSIS — Z51.89 ENCOUNTER FOR OTHER SPECIFIED AFTERCARE: ICD-10-CM

## 2017-11-03 DIAGNOSIS — C61 MALIGNANT NEOPLASM OF PROSTATE: ICD-10-CM

## 2017-11-03 PROCEDURE — 74177 CT ABD & PELVIS W/CONTRAST: CPT

## 2017-11-03 PROCEDURE — 74177 CT ABD & PELVIS W/CONTRAST: CPT | Mod: 26

## 2017-11-14 ENCOUNTER — RESULT REVIEW (OUTPATIENT)
Age: 62
End: 2017-11-14

## 2017-11-14 ENCOUNTER — APPOINTMENT (OUTPATIENT)
Dept: HEMATOLOGY ONCOLOGY | Facility: CLINIC | Age: 62
End: 2017-11-14
Payer: COMMERCIAL

## 2017-11-14 VITALS
DIASTOLIC BLOOD PRESSURE: 83 MMHG | SYSTOLIC BLOOD PRESSURE: 149 MMHG | OXYGEN SATURATION: 99 % | WEIGHT: 156.09 LBS | TEMPERATURE: 98.2 F | RESPIRATION RATE: 16 BRPM | HEART RATE: 90 BPM | BODY MASS INDEX: 26.79 KG/M2

## 2017-11-14 LAB
BASOPHILS # BLD AUTO: 0 K/UL — SIGNIFICANT CHANGE UP (ref 0–0.2)
EOSINOPHIL # BLD AUTO: 0.1 K/UL — SIGNIFICANT CHANGE UP (ref 0–0.5)
HCT VFR BLD CALC: 32.9 % — LOW (ref 39–50)
HGB BLD-MCNC: 11 G/DL — LOW (ref 13–17)
LYMPHOCYTES # BLD AUTO: 1.1 K/UL — SIGNIFICANT CHANGE UP (ref 1–3.3)
LYMPHOCYTES # BLD AUTO: 8 % — LOW (ref 13–44)
MCHC RBC-ENTMCNC: 29.9 PG — SIGNIFICANT CHANGE UP (ref 27–34)
MCHC RBC-ENTMCNC: 33.3 G/DL — SIGNIFICANT CHANGE UP (ref 32–36)
MCV RBC AUTO: 89.9 FL — SIGNIFICANT CHANGE UP (ref 80–100)
MONOCYTES # BLD AUTO: 0.1 K/UL — SIGNIFICANT CHANGE UP (ref 0–0.9)
NEUTROPHILS # BLD AUTO: 29.2 K/UL — HIGH (ref 1.8–7.4)
NEUTROPHILS NFR BLD AUTO: 92 % — HIGH (ref 43–77)
PLAT MORPH BLD: NORMAL — SIGNIFICANT CHANGE UP
PLATELET # BLD AUTO: 230 K/UL — SIGNIFICANT CHANGE UP (ref 150–400)
RBC # BLD: 3.66 M/UL — LOW (ref 4.2–5.8)
RBC # FLD: 14.1 % — SIGNIFICANT CHANGE UP (ref 10.3–14.5)
RBC BLD AUTO: SIGNIFICANT CHANGE UP
WBC # BLD: 30.5 K/UL — HIGH (ref 3.8–10.5)
WBC # FLD AUTO: 30.5 K/UL — HIGH (ref 3.8–10.5)

## 2017-11-14 PROCEDURE — 99214 OFFICE O/P EST MOD 30 MIN: CPT

## 2017-11-14 RX ORDER — DRONABINOL 5 MG/1
5 CAPSULE ORAL
Qty: 60 | Refills: 0 | Status: ACTIVE | COMMUNITY
Start: 2017-07-19 | End: 1900-01-01

## 2017-11-15 LAB
ALBUMIN SERPL ELPH-MCNC: 3.6 G/DL
ALP BLD-CCNC: 205 U/L
ALT SERPL-CCNC: 20 U/L
ANION GAP SERPL CALC-SCNC: 17 MMOL/L
AST SERPL-CCNC: 22 U/L
BILIRUB SERPL-MCNC: 0.3 MG/DL
BUN SERPL-MCNC: 10 MG/DL
CALCIUM SERPL-MCNC: 9.2 MG/DL
CHLORIDE SERPL-SCNC: 108 MMOL/L
CO2 SERPL-SCNC: 24 MMOL/L
CREAT SERPL-MCNC: 0.84 MG/DL
GLUCOSE SERPL-MCNC: 90 MG/DL
LDH SERPL-CCNC: 432 U/L
POTASSIUM SERPL-SCNC: 4.2 MMOL/L
PROT SERPL-MCNC: 5.7 G/DL
PSA SERPL-MCNC: 102.7 NG/ML
SODIUM SERPL-SCNC: 149 MMOL/L

## 2017-11-16 LAB — CGA SERPL-MCNC: 171 NG/ML

## 2017-11-16 RX ORDER — ENZALUTAMIDE 40 MG/1
40 CAPSULE ORAL
Qty: 120 | Refills: 4 | Status: ACTIVE | COMMUNITY
Start: 2017-11-14 | End: 1900-01-01

## 2017-11-21 ENCOUNTER — APPOINTMENT (OUTPATIENT)
Dept: INFUSION THERAPY | Facility: HOSPITAL | Age: 62
End: 2017-11-21

## 2017-11-22 ENCOUNTER — APPOINTMENT (OUTPATIENT)
Dept: INFUSION THERAPY | Facility: HOSPITAL | Age: 62
End: 2017-11-22

## 2017-11-27 RX ORDER — TRAMADOL HYDROCHLORIDE 50 MG/1
50 TABLET, COATED ORAL
Qty: 120 | Refills: 0 | Status: ACTIVE | COMMUNITY
Start: 2017-11-27 | End: 1900-01-01

## 2017-11-28 ENCOUNTER — OUTPATIENT (OUTPATIENT)
Dept: OUTPATIENT SERVICES | Facility: HOSPITAL | Age: 62
LOS: 1 days | Discharge: HOME | End: 2017-11-28

## 2017-11-28 DIAGNOSIS — C61 MALIGNANT NEOPLASM OF PROSTATE: ICD-10-CM

## 2017-11-29 ENCOUNTER — APPOINTMENT (OUTPATIENT)
Dept: HEMATOLOGY ONCOLOGY | Facility: CLINIC | Age: 62
End: 2017-11-29

## 2017-12-14 ENCOUNTER — RESULT REVIEW (OUTPATIENT)
Age: 62
End: 2017-12-14

## 2017-12-14 ENCOUNTER — APPOINTMENT (OUTPATIENT)
Dept: HEMATOLOGY ONCOLOGY | Facility: CLINIC | Age: 62
End: 2017-12-14
Payer: COMMERCIAL

## 2017-12-14 VITALS
OXYGEN SATURATION: 98 % | RESPIRATION RATE: 16 BRPM | HEART RATE: 120 BPM | SYSTOLIC BLOOD PRESSURE: 130 MMHG | DIASTOLIC BLOOD PRESSURE: 90 MMHG | BODY MASS INDEX: 24.22 KG/M2 | TEMPERATURE: 98.6 F | WEIGHT: 141.07 LBS

## 2017-12-14 DIAGNOSIS — R52 PAIN, UNSPECIFIED: ICD-10-CM

## 2017-12-14 DIAGNOSIS — R63.4 ABNORMAL WEIGHT LOSS: ICD-10-CM

## 2017-12-14 DIAGNOSIS — C61 MALIGNANT NEOPLASM OF PROSTATE: ICD-10-CM

## 2017-12-14 DIAGNOSIS — C78.7 SECONDARY MALIGNANT NEOPLASM OF LIVER AND INTRAHEPATIC BILE DUCT: ICD-10-CM

## 2017-12-14 LAB
HCT VFR BLD CALC: 32.2 % — LOW (ref 39–50)
HGB BLD-MCNC: 10.6 G/DL — LOW (ref 13–17)
MCHC RBC-ENTMCNC: 28.5 PG — SIGNIFICANT CHANGE UP (ref 27–34)
MCHC RBC-ENTMCNC: 32.9 G/DL — SIGNIFICANT CHANGE UP (ref 32–36)
MCV RBC AUTO: 86.6 FL — SIGNIFICANT CHANGE UP (ref 80–100)
PLATELET # BLD AUTO: 426 K/UL — HIGH (ref 150–400)
RBC # BLD: 3.72 M/UL — LOW (ref 4.2–5.8)
RBC # FLD: 14 % — SIGNIFICANT CHANGE UP (ref 10.3–14.5)
WBC # BLD: 10.6 K/UL — HIGH (ref 3.8–10.5)
WBC # FLD AUTO: 10.6 K/UL — HIGH (ref 3.8–10.5)

## 2017-12-14 PROCEDURE — 99215 OFFICE O/P EST HI 40 MIN: CPT

## 2017-12-14 RX ORDER — METOCLOPRAMIDE 10 MG/1
10 TABLET ORAL
Refills: 0 | Status: DISCONTINUED | COMMUNITY
End: 2017-12-14

## 2017-12-14 RX ORDER — CABAZITAXEL FDN10MG/ML
KIT INTRAVENOUS
Refills: 0 | Status: ACTIVE | COMMUNITY

## 2017-12-14 RX ORDER — OXYCODONE 5 MG/1
5 TABLET ORAL
Qty: 180 | Refills: 0 | Status: ACTIVE | COMMUNITY
Start: 2017-12-14 | End: 1900-01-01

## 2017-12-14 RX ORDER — OXYCODONE 5 MG/1
5 TABLET ORAL
Qty: 30 | Refills: 0 | Status: DISCONTINUED | COMMUNITY
Start: 2017-09-08 | End: 2017-12-14

## 2017-12-14 RX ORDER — OXYCODONE HYDROCHLORIDE 5 MG/1
5 CAPSULE ORAL
Qty: 240 | Refills: 0 | Status: DISCONTINUED | COMMUNITY
Start: 2017-12-14 | End: 2017-12-14

## 2017-12-14 RX ORDER — CYCLOBENZAPRINE HYDROCHLORIDE 5 MG/1
5 TABLET, FILM COATED ORAL
Qty: 90 | Refills: 0 | Status: ACTIVE | COMMUNITY
Start: 2017-12-14 | End: 1900-01-01

## 2017-12-14 RX ORDER — ALPRAZOLAM 0.5 MG/1
0.5 TABLET ORAL
Qty: 60 | Refills: 0 | Status: DISCONTINUED | COMMUNITY
Start: 2017-03-01 | End: 2017-12-14

## 2017-12-15 LAB
ALBUMIN SERPL ELPH-MCNC: 3.4 G/DL
ALP BLD-CCNC: 227 U/L
ALT SERPL-CCNC: 12 U/L
ANION GAP SERPL CALC-SCNC: 17 MMOL/L
AST SERPL-CCNC: 23 U/L
BILIRUB SERPL-MCNC: 0.8 MG/DL
BUN SERPL-MCNC: 17 MG/DL
CALCIUM SERPL-MCNC: 10.1 MG/DL
CHLORIDE SERPL-SCNC: 103 MMOL/L
CO2 SERPL-SCNC: 20 MMOL/L
CREAT SERPL-MCNC: 0.93 MG/DL
GLUCOSE SERPL-MCNC: 104 MG/DL
LDH SERPL-CCNC: 607 U/L
POTASSIUM SERPL-SCNC: 4.5 MMOL/L
PROT SERPL-MCNC: 5.9 G/DL
PSA SERPL-MCNC: 238.4 NG/ML
SODIUM SERPL-SCNC: 140 MMOL/L

## 2017-12-22 ENCOUNTER — APPOINTMENT (OUTPATIENT)
Dept: GERIATRICS | Facility: CLINIC | Age: 62
End: 2017-12-22

## 2018-01-01 ENCOUNTER — INPATIENT (INPATIENT)
Facility: HOSPITAL | Age: 63
LOS: 3 days | Discharge: NURSING, REHAB OR LONG-TERM CARE | DRG: 871 | End: 2018-01-05
Attending: INTERNAL MEDICINE | Admitting: HOSPITALIST
Payer: COMMERCIAL

## 2018-01-01 VITALS
HEART RATE: 120 BPM | DIASTOLIC BLOOD PRESSURE: 85 MMHG | RESPIRATION RATE: 20 BRPM | TEMPERATURE: 102 F | SYSTOLIC BLOOD PRESSURE: 128 MMHG | OXYGEN SATURATION: 98 %

## 2018-01-01 DIAGNOSIS — G95.20 UNSPECIFIED CORD COMPRESSION: ICD-10-CM

## 2018-01-01 LAB
ALBUMIN SERPL ELPH-MCNC: 3.2 G/DL — LOW (ref 3.3–5)
ALP SERPL-CCNC: 268 U/L — HIGH (ref 40–120)
ALT FLD-CCNC: 32 U/L RC — SIGNIFICANT CHANGE UP (ref 10–45)
ANION GAP SERPL CALC-SCNC: 12 MMOL/L — SIGNIFICANT CHANGE UP (ref 5–17)
APPEARANCE UR: CLEAR — SIGNIFICANT CHANGE UP
APTT BLD: 40 SEC — HIGH (ref 27.5–37.4)
AST SERPL-CCNC: 46 U/L — HIGH (ref 10–40)
BACTERIA # UR AUTO: ABNORMAL /HPF
BASE EXCESS BLDV CALC-SCNC: 0.8 MMOL/L — SIGNIFICANT CHANGE UP (ref -2–2)
BASE EXCESS BLDV CALC-SCNC: 1.3 MMOL/L — SIGNIFICANT CHANGE UP (ref -2–2)
BASOPHILS # BLD AUTO: 0 K/UL — SIGNIFICANT CHANGE UP (ref 0–0.2)
BASOPHILS NFR BLD AUTO: 0.1 % — SIGNIFICANT CHANGE UP (ref 0–2)
BILIRUB SERPL-MCNC: 0.4 MG/DL — SIGNIFICANT CHANGE UP (ref 0.2–1.2)
BILIRUB UR-MCNC: NEGATIVE — SIGNIFICANT CHANGE UP
BUN SERPL-MCNC: 18 MG/DL — SIGNIFICANT CHANGE UP (ref 7–23)
CA-I SERPL-SCNC: 1.18 MMOL/L — SIGNIFICANT CHANGE UP (ref 1.12–1.3)
CA-I SERPL-SCNC: 1.28 MMOL/L — SIGNIFICANT CHANGE UP (ref 1.12–1.3)
CALCIUM SERPL-MCNC: 10 MG/DL — SIGNIFICANT CHANGE UP (ref 8.4–10.5)
CHLORIDE BLDV-SCNC: 100 MMOL/L — SIGNIFICANT CHANGE UP (ref 96–108)
CHLORIDE BLDV-SCNC: 108 MMOL/L — SIGNIFICANT CHANGE UP (ref 96–108)
CHLORIDE SERPL-SCNC: 98 MMOL/L — SIGNIFICANT CHANGE UP (ref 96–108)
CO2 BLDV-SCNC: 26 MMOL/L — SIGNIFICANT CHANGE UP (ref 22–30)
CO2 BLDV-SCNC: 27 MMOL/L — SIGNIFICANT CHANGE UP (ref 22–30)
CO2 SERPL-SCNC: 25 MMOL/L — SIGNIFICANT CHANGE UP (ref 22–31)
COLOR SPEC: YELLOW — SIGNIFICANT CHANGE UP
CREAT SERPL-MCNC: 1.03 MG/DL — SIGNIFICANT CHANGE UP (ref 0.5–1.3)
DIFF PNL FLD: NEGATIVE — SIGNIFICANT CHANGE UP
EOSINOPHIL # BLD AUTO: 0 K/UL — SIGNIFICANT CHANGE UP (ref 0–0.5)
EOSINOPHIL NFR BLD AUTO: 0.1 % — SIGNIFICANT CHANGE UP (ref 0–6)
EPI CELLS # UR: SIGNIFICANT CHANGE UP /HPF
GAS PNL BLDV: 132 MMOL/L — LOW (ref 136–145)
GAS PNL BLDV: 134 MMOL/L — LOW (ref 136–145)
GAS PNL BLDV: SIGNIFICANT CHANGE UP
GLUCOSE BLDV-MCNC: 100 MG/DL — HIGH (ref 70–99)
GLUCOSE BLDV-MCNC: 106 MG/DL — HIGH (ref 70–99)
GLUCOSE SERPL-MCNC: 104 MG/DL — HIGH (ref 70–99)
GLUCOSE UR QL: NEGATIVE — SIGNIFICANT CHANGE UP
HCO3 BLDV-SCNC: 25 MMOL/L — SIGNIFICANT CHANGE UP (ref 21–29)
HCO3 BLDV-SCNC: 26 MMOL/L — SIGNIFICANT CHANGE UP (ref 21–29)
HCT VFR BLD CALC: 32 % — LOW (ref 39–50)
HCT VFR BLDA CALC: 26 % — LOW (ref 39–50)
HCT VFR BLDA CALC: 32 % — LOW (ref 39–50)
HGB BLD CALC-MCNC: 10.2 G/DL — LOW (ref 13–17)
HGB BLD CALC-MCNC: 8.3 G/DL — LOW (ref 13–17)
HGB BLD-MCNC: 10.6 G/DL — LOW (ref 13–17)
INR BLD: 1.31 RATIO — HIGH (ref 0.88–1.16)
KETONES UR-MCNC: NEGATIVE — SIGNIFICANT CHANGE UP
LACTATE BLDV-MCNC: 1 MMOL/L — SIGNIFICANT CHANGE UP (ref 0.7–2)
LACTATE BLDV-MCNC: 2.5 MMOL/L — HIGH (ref 0.7–2)
LEUKOCYTE ESTERASE UR-ACNC: NEGATIVE — SIGNIFICANT CHANGE UP
LYMPHOCYTES # BLD AUTO: 1.2 K/UL — SIGNIFICANT CHANGE UP (ref 1–3.3)
LYMPHOCYTES # BLD AUTO: 9 % — LOW (ref 13–44)
MCHC RBC-ENTMCNC: 28.2 PG — SIGNIFICANT CHANGE UP (ref 27–34)
MCHC RBC-ENTMCNC: 33.1 GM/DL — SIGNIFICANT CHANGE UP (ref 32–36)
MCV RBC AUTO: 85.3 FL — SIGNIFICANT CHANGE UP (ref 80–100)
MONOCYTES # BLD AUTO: 0.7 K/UL — SIGNIFICANT CHANGE UP (ref 0–0.9)
MONOCYTES NFR BLD AUTO: 5.7 % — SIGNIFICANT CHANGE UP (ref 2–14)
NEUTROPHILS # BLD AUTO: 11.1 K/UL — HIGH (ref 1.8–7.4)
NEUTROPHILS NFR BLD AUTO: 85.2 % — HIGH (ref 43–77)
NITRITE UR-MCNC: NEGATIVE — SIGNIFICANT CHANGE UP
OTHER CELLS CSF MANUAL: 4 ML/DL — LOW (ref 18–22)
OTHER CELLS CSF MANUAL: 4 ML/DL — LOW (ref 18–22)
PCO2 BLDV: 42 MMHG — SIGNIFICANT CHANGE UP (ref 35–50)
PCO2 BLDV: 43 MMHG — SIGNIFICANT CHANGE UP (ref 35–50)
PH BLDV: 7.39 — SIGNIFICANT CHANGE UP (ref 7.35–7.45)
PH BLDV: 7.39 — SIGNIFICANT CHANGE UP (ref 7.35–7.45)
PH UR: 6.5 — SIGNIFICANT CHANGE UP (ref 5–8)
PLATELET # BLD AUTO: 637 K/UL — HIGH (ref 150–400)
PO2 BLDV: 24 MMHG — LOW (ref 25–45)
PO2 BLDV: 27 MMHG — SIGNIFICANT CHANGE UP (ref 25–45)
POTASSIUM BLDV-SCNC: 4.3 MMOL/L — SIGNIFICANT CHANGE UP (ref 3.5–5)
POTASSIUM BLDV-SCNC: 4.8 MMOL/L — SIGNIFICANT CHANGE UP (ref 3.5–5)
POTASSIUM SERPL-MCNC: 5.1 MMOL/L — SIGNIFICANT CHANGE UP (ref 3.5–5.3)
POTASSIUM SERPL-SCNC: 5.1 MMOL/L — SIGNIFICANT CHANGE UP (ref 3.5–5.3)
PROT SERPL-MCNC: 7.2 G/DL — SIGNIFICANT CHANGE UP (ref 6–8.3)
PROT UR-MCNC: 100 MG/DL
PROTHROM AB SERPL-ACNC: 14.4 SEC — HIGH (ref 9.8–12.7)
RAPID RVP RESULT: SIGNIFICANT CHANGE UP
RBC # BLD: 3.75 M/UL — LOW (ref 4.2–5.8)
RBC # FLD: 14.5 % — SIGNIFICANT CHANGE UP (ref 10.3–14.5)
RBC CASTS # UR COMP ASSIST: SIGNIFICANT CHANGE UP /HPF (ref 0–2)
SAO2 % BLDV: 30 % — LOW (ref 67–88)
SAO2 % BLDV: 37 % — LOW (ref 67–88)
SODIUM SERPL-SCNC: 135 MMOL/L — SIGNIFICANT CHANGE UP (ref 135–145)
SP GR SPEC: 1.02 — SIGNIFICANT CHANGE UP (ref 1.01–1.02)
UROBILINOGEN FLD QL: NEGATIVE — SIGNIFICANT CHANGE UP
WBC # BLD: 13.1 K/UL — HIGH (ref 3.8–10.5)
WBC # FLD AUTO: 13.1 K/UL — HIGH (ref 3.8–10.5)
WBC UR QL: SIGNIFICANT CHANGE UP /HPF (ref 0–5)

## 2018-01-01 PROCEDURE — 72148 MRI LUMBAR SPINE W/O DYE: CPT | Mod: 26

## 2018-01-01 PROCEDURE — 71045 X-RAY EXAM CHEST 1 VIEW: CPT | Mod: 26

## 2018-01-01 PROCEDURE — 93010 ELECTROCARDIOGRAM REPORT: CPT

## 2018-01-01 PROCEDURE — 72141 MRI NECK SPINE W/O DYE: CPT | Mod: 26

## 2018-01-01 PROCEDURE — 99285 EMERGENCY DEPT VISIT HI MDM: CPT | Mod: 25

## 2018-01-01 PROCEDURE — 72146 MRI CHEST SPINE W/O DYE: CPT | Mod: 26

## 2018-01-01 RX ORDER — AZITHROMYCIN 500 MG/1
500 TABLET, FILM COATED ORAL ONCE
Qty: 0 | Refills: 0 | Status: COMPLETED | OUTPATIENT
Start: 2018-01-01 | End: 2018-01-01

## 2018-01-01 RX ORDER — SODIUM CHLORIDE 9 MG/ML
3 INJECTION INTRAMUSCULAR; INTRAVENOUS; SUBCUTANEOUS ONCE
Qty: 0 | Refills: 0 | Status: COMPLETED | OUTPATIENT
Start: 2018-01-01 | End: 2018-01-01

## 2018-01-01 RX ORDER — SODIUM CHLORIDE 9 MG/ML
500 INJECTION INTRAMUSCULAR; INTRAVENOUS; SUBCUTANEOUS
Qty: 0 | Refills: 0 | Status: COMPLETED | OUTPATIENT
Start: 2018-01-01 | End: 2018-01-01

## 2018-01-01 RX ORDER — ACETAMINOPHEN 500 MG
1000 TABLET ORAL ONCE
Qty: 0 | Refills: 0 | Status: COMPLETED | OUTPATIENT
Start: 2018-01-01 | End: 2018-01-01

## 2018-01-01 RX ORDER — MORPHINE SULFATE 50 MG/1
2 CAPSULE, EXTENDED RELEASE ORAL ONCE
Qty: 0 | Refills: 0 | Status: DISCONTINUED | OUTPATIENT
Start: 2018-01-01 | End: 2018-01-01

## 2018-01-01 RX ORDER — DEXAMETHASONE 0.5 MG/5ML
10 ELIXIR ORAL ONCE
Qty: 0 | Refills: 0 | Status: COMPLETED | OUTPATIENT
Start: 2018-01-01 | End: 2018-01-01

## 2018-01-01 RX ORDER — CEFTRIAXONE 500 MG/1
1 INJECTION, POWDER, FOR SOLUTION INTRAMUSCULAR; INTRAVENOUS EVERY 24 HOURS
Qty: 0 | Refills: 0 | Status: DISCONTINUED | OUTPATIENT
Start: 2018-01-01 | End: 2018-01-02

## 2018-01-01 RX ORDER — CYCLOBENZAPRINE HYDROCHLORIDE 10 MG/1
5 TABLET, FILM COATED ORAL ONCE
Qty: 0 | Refills: 0 | Status: COMPLETED | OUTPATIENT
Start: 2018-01-01 | End: 2018-01-01

## 2018-01-01 RX ADMIN — MORPHINE SULFATE 2 MILLIGRAM(S): 50 CAPSULE, EXTENDED RELEASE ORAL at 15:43

## 2018-01-01 RX ADMIN — SODIUM CHLORIDE 2000 MILLILITER(S): 9 INJECTION INTRAMUSCULAR; INTRAVENOUS; SUBCUTANEOUS at 17:31

## 2018-01-01 RX ADMIN — SODIUM CHLORIDE 2000 MILLILITER(S): 9 INJECTION INTRAMUSCULAR; INTRAVENOUS; SUBCUTANEOUS at 16:00

## 2018-01-01 RX ADMIN — Medication 102 MILLIGRAM(S): at 23:33

## 2018-01-01 RX ADMIN — SODIUM CHLORIDE 3 MILLILITER(S): 9 INJECTION INTRAMUSCULAR; INTRAVENOUS; SUBCUTANEOUS at 15:00

## 2018-01-01 RX ADMIN — SODIUM CHLORIDE 2000 MILLILITER(S): 9 INJECTION INTRAMUSCULAR; INTRAVENOUS; SUBCUTANEOUS at 15:17

## 2018-01-01 RX ADMIN — MORPHINE SULFATE 2 MILLIGRAM(S): 50 CAPSULE, EXTENDED RELEASE ORAL at 16:31

## 2018-01-01 RX ADMIN — SODIUM CHLORIDE 2000 MILLILITER(S): 9 INJECTION INTRAMUSCULAR; INTRAVENOUS; SUBCUTANEOUS at 16:10

## 2018-01-01 RX ADMIN — SODIUM CHLORIDE 2000 MILLILITER(S): 9 INJECTION INTRAMUSCULAR; INTRAVENOUS; SUBCUTANEOUS at 15:07

## 2018-01-01 RX ADMIN — CYCLOBENZAPRINE HYDROCHLORIDE 5 MILLIGRAM(S): 10 TABLET, FILM COATED ORAL at 15:43

## 2018-01-01 RX ADMIN — AZITHROMYCIN 250 MILLIGRAM(S): 500 TABLET, FILM COATED ORAL at 18:27

## 2018-01-01 RX ADMIN — CEFTRIAXONE 100 GRAM(S): 500 INJECTION, POWDER, FOR SOLUTION INTRAMUSCULAR; INTRAVENOUS at 16:42

## 2018-01-01 RX ADMIN — Medication 400 MILLIGRAM(S): at 16:42

## 2018-01-01 NOTE — ED PROVIDER NOTE - OBJECTIVE STATEMENT
62 year old M w/ hx metastatic prostate cancer ([address] 9 at diagnosis, TNM stage: T3b, N1, M1 AJCC Stage: IV w/ liver metastases also metastasis to thoracic spine, bilateral ribs, and left scapula) presenting with 3 day history of worsening B/L lower extremity weakness. Patient states he has been having difficulty walking the last few days and has felt generalized weakness especially prominent in lower extremities. Also muscle spasms of back, he says he has taken muscle relaxer before with relief. Says weakness started after starting on Enzalutamide therapy 2 months ago. Discontinued therapy for the last 2 weeks and restarted again today. Patient also having nausea this AM, low grade fevers and chills X last 2 days, and episode of urinary incontinence/ leakage this AM. Also notes cough and shortness of breath with exertion. 1 episode of diarrhea this AM. Patient denies numbness/ tingling/ paresthesias. Stable LLE swelling. No abdominal pain. 62 year old M w/ hx metastatic prostate cancer ([address] 9 at diagnosis, TNM stage: T3b, N1, M1 AJCC Stage: IV w/ liver metastases also metastasis to thoracic spine, bilateral ribs, and left scapula) presenting with 3 day history of worsening B/L lower extremity weakness. Patient states he has been having difficulty walking the last few days and has felt generalized weakness especially prominent in lower extremities. Also muscle spasms of back, he says he has taken muscle relaxer before with relief. Says weakness started after starting on Enzalutamide therapy 2 months ago. Discontinued therapy for the last 2 weeks and restarted again today. Patient also having nausea this AM, low grade fevers and chills X last 2 days, and episode of urinary incontinence/ leakage this AM. Also notes cough and shortness of breath with exertion. 1 episode of diarrhea this AM. Patient denies numbness/ tingling/ paresthesias. Stable LLE swelling. No abdominal pain. PCP Dr. Obie Gamble [facility], Oncologist Dr. Olman Oshea at [facility].

## 2018-01-01 NOTE — ED PROVIDER NOTE - ATTENDING CONTRIBUTION TO CARE
62 year old M w/ hx metastatic prostate cancer ([place] 9 at diagnosis, TNM stage: T3b, N1, M1 AJCC Stage: IV w/ liver metastases also metastasis to thoracic spine, bilateral ribs, and left scapula) presenting with 3 day history of worsening B/L lower extremity weakness. motor strength 5/5 b/l le, found to be in retention with marina placement. pt presents with SS, order set used, ivf, labs with mild lactate elevation.

## 2018-01-01 NOTE — ED PROVIDER NOTE - NOTES
Spoke to Neurosurgical resident regarding MRI findings of cord compression T1,2, 5-7 w/ spinal canal stenosis. Will see patient in ED. State symptoms sounds chronic and likely no acute surgical intervention.
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
## 2018-01-01 NOTE — ED PROVIDER NOTE - PROGRESS NOTE DETAILS
Dr. Chris: Pt received in sign out pending RVP, CXR. Febrile on arrival. WBC 13. Lactate 2.5. Given abx for ? PNA. Pt w known hx of prostate CA with bony metastatic lesions to T and L spine. He also notes BL LE weakness, worsening. MRI was ordered which shows compression T1-2, 5-7. Nsx consulted, agree with decadron. Will eval. Pt to be admitted. Currently pain free.

## 2018-01-01 NOTE — ED PROVIDER NOTE - CHIEF COMPLAINT
The patient is a 62y Male complaining of The patient is a 62y Male complaining of worsening lower extremity weakness.

## 2018-01-01 NOTE — ED PROVIDER NOTE - NEUROLOGICAL, MLM
Alert and oriented, no focal deficits, no motor or sensory deficits. B/L LE strength 4/5, sensation intact B/L.

## 2018-01-01 NOTE — ED ADULT NURSE NOTE - OBJECTIVE STATEMENT
63 y/o M, PMH Prostate CA with mets to spine, BIBA from home c/o weakness, fever and diarrhea x 1 this morning. Pt was 1st diagnosed with Prostate CA in 2010, treated and his PSA levels went down, dx again in approx 2013 when his PSA levels went up again, pt began chemo approx 8 months ago for 6 months course, finished in October, started radiation for 1 month in October and then started a new PO chemo X-tandi 6 weeks ago in November. Pt stopped the PO chemo 2 weeks ago because it was making him feel weak and caused him to start to feel back spasms. Pt reports his wife made him re-start the chemo today and after taking it this morning he felt weak, had one episode of nonbloody diarrhea today, and felt fever, his wife measured 100.9F at home today, pt currently 102.4F rectally, MD Parson aware, pt tachycardic to 120, EDT at bedside performing EKG. Pt cleaned, diaper changed, one soft nonbloody bowel movement noted in diaper. Pt reports he was walking independently up until 6 weeks ago when he started this new PO chemo he has been too weak to walk. Pt currently denies any pain, reports it feels like his back is "locked up." Pt reports 2 episodes urinary incontinence today. Pt also has LLE edema, states its been normal since he restarted chemo over 6 months ago, pt Ghassan independently but with generalized weakness. Pt denies headache, dizziness, chest pain, palpitations, cough, SOB, abdominal pain, n/v at this time. Wife at bedside, safety maintained.

## 2018-01-01 NOTE — CONSULT NOTE ADULT - ASSESSMENT
62M with known metastatic prostate cancer, according to outpatient notes, and extent of disease, the patient most likely would not benefit from neurosurgical intervention. Tokashi Score for prognosis is (1+0+0+0+5+1)=7 meaning prognosis is likely less than 6 months and recommending conservative management His most recent note from his oncologist seems to reflect this as well. Would recommend pain control, radiation oncology consult for potential palliative radiation, and oncology follow up for further treatment options. May continue decadron 4q6 and taper as per oncology recommendations.

## 2018-01-01 NOTE — ED ADULT NURSE REASSESSMENT NOTE - NS ED NURSE REASSESS COMMENT FT1
Pt reports he feels his back is spasming. Pt medicated as ordered by MD. Pt straight cathed using sterile technique. 2 RNs present to confirm sterility. Pt tolerated procedure well. Initial output approx 50 cc urine noted, pt then reports he feels his back begin to relax and "release" and approx 500 cc urine emptied into bag. MD aware. Sterile specimen collected. UA and Culture sent as ordered.

## 2018-01-01 NOTE — ED PROVIDER NOTE - NS ED MD TWO NIGHTS YN
This is a 53-year-old female who is presenting in order to discuss a few issues now that she has her insurance back.  She has a diagnosis of a Chiari malformation 1 diagnosed in  and she would like to have another MRI scan for interval reevaluation. This was seen by neurology while she was still at the [facility]. She is not wishing to see them at this time. She was told she had migraine headaches which she disagreed with and therefore left the practice.  She was recently seen in urgent care for back pain. She states that she does have pain underneath both shoulder blades left and right. She has not picked up her medications yet from urgent care because she does not have enough money; she anticipates getting them in about 48 hours.  She would like to reestablish with rheumatology  One review the patient's records, she was to have follow-up cholesterol laboratory in March.   Her refills according to Epic should have . She states that she does still have medications and reports compliance, pertaining to atorvastatin and venlafaxine    There is no physical examination today    Impression  1. Fibromyalgia per history  2. Chiari I malformation  3. Tobacco dependence  4. Hyperlipidemia.  5. Anxiety    Plan  1. Tobacco cessation has been discussed  2. Refer to rheumatology  3. MRI scan of the brain ordered for reassessment of Chiari I malformation  4. I have refilled her atorvastatin and venlafaxine for a total of 3 months  5. Compliance has been discussed as well. She is aware that the fasting lipid panel is pending, and the results are also assumed to be reflecting her taking atorvastatin which she concurs  8. P.r.n. follow-up  20 minutes spent with her today completely in counseling medication management  
Yes

## 2018-01-01 NOTE — ED ADULT NURSE REASSESSMENT NOTE - NS ED NURSE REASSESS COMMENT FT1
Pt AAOx4, NAD, resp nonlabored, resting comfortably in bed with spouse at bedside. Pt reports some improvement in generalized weakness. Pt now denies fevers, chills. Pt appears less pale and with more energy than time of arrival. Wife at bedside reports pt seems more alert/at his baseline level of energy. Pt denies any back spasms/pain at this time. Pt has pos/equal sensation to extremities x 4, no sensory deficits, pt Ghassan independently but with some decreased strength due to generalized weakness. Pt denies headache, dizziness, chest pain, palpitations, SOB, abd pain, n/v at this time. Pt awaiting MRI and admit. Safety maintained. Pt AAOx4, NAD, resp nonlabored, resting comfortably in bed with spouse at bedside. Pt reports some improvement in generalized weakness. Pt now denies fevers, chills. Pt appears less pale and with more energy than time of arrival. Wife at bedside reports pt seems more alert/at his baseline level of energy. Pt denies any back spasms/pain at this time. Pt has pos/equal sensation to extremities x 4, no sensory deficits, pt Ghassan independently but with some decreased strength due to generalized weakness. Pt denies headache, dizziness, chest pain, palpitations, SOB, abd pain, n/v at this time. Pt awaiting MRI and admit. Safety maintained.    2220: Pt back from MRI. Pt AAOx4, NAD, resp nonlabored, resting comfortably in bed with family at bedside. Pt c/o . Pt denies headache, dizziness, chest pain, palpitations, SOB, abd pain, n/v/d, urinary symptoms, fevers, chills, weakness at this time. Pt awaiting . Safety maintained. Pt AAOx4, NAD, resp nonlabored, resting comfortably in bed with spouse at bedside. Pt reports some improvement in generalized weakness. Pt now denies fevers, chills. Pt appears less pale and with more energy than time of arrival. Wife at bedside reports pt seems more alert/at his baseline level of energy. Pt denies any back spasms/pain at this time. Pt has pos/equal sensation to extremities x 4, no sensory deficits, pt Ghassan independently but with some decreased strength due to generalized weakness. Pt denies headache, dizziness, chest pain, palpitations, SOB, abd pain, n/v at this time. Pt awaiting MRI and admit. Safety maintained.    2220: Pt back from MRI. Pt AAOx4, NAD, resp nonlabored, resting comfortably in bed. Pt c/o same generalized weakness, pt denies any back spasms at this time. Pt denies headache, dizziness, chest pain, palpitations, SOB, abd pain, n/v/d, fevers, chills at this time. Pt awaiting Neuro Surg consult and admit. Safety maintained.    2300: Neuro Surg MD at bedside. Pt AAOx4, NAD, resp nonlabored, resting comfortably in bed with spouse at bedside. Pt reports some improvement in generalized weakness. Pt now denies fevers, chills. Pt appears less pale and with more energy than time of arrival. Wife at bedside reports pt seems more alert/at his baseline level of energy. Pt denies any back spasms/pain at this time. Pt has pos/equal sensation to extremities x 4, no sensory deficits, pt Ghassan independently but with some decreased strength due to generalized weakness. Pt denies headache, dizziness, chest pain, palpitations, SOB, abd pain, n/v at this time. Pt awaiting MRI and admit. Safety maintained.    2220: Pt back from MRI. Pt AAOx4, NAD, resp nonlabored, resting comfortably in bed. Pt c/o same generalized weakness, pt denies any back spasms at this time. Pt denies headache, dizziness, chest pain, palpitations, SOB, abd pain, n/v/d, fevers, chills at this time. Pt awaiting Neuro Surg consult and admit. Safety maintained.    0240: Pt AAOx4, NAD, resp nonlabored, resting comfortably in bed. Pt denies any back spasms at this time. Pt has pos and equal sensation to extremities bilat, strong peripheral pulses x 4, no numbness/tingling, no sensory deficits, pt Ghassan independently but with same generalized weakness. Pt denies headache, dizziness, chest pain, SOB, abd pain, n/v/d, fevers, chills at this time. Oral temp 100F, HR 98, ED CAS Sung made aware in verbal handoff, Admitting MD Aaron domínguez. Pt awaiting bed, plan of care explained. Safety maintained.     2300: Neuro Surg MD at bedside.

## 2018-01-01 NOTE — ED ADULT NURSE REASSESSMENT NOTE - GENERAL PATIENT STATE
comfortable appearance/cooperative/family/SO at bedside/resting/sleeping/improvement verbalized/smiling/interactive

## 2018-01-02 DIAGNOSIS — Z29.9 ENCOUNTER FOR PROPHYLACTIC MEASURES, UNSPECIFIED: ICD-10-CM

## 2018-01-02 DIAGNOSIS — M62.830 MUSCLE SPASM OF BACK: ICD-10-CM

## 2018-01-02 DIAGNOSIS — R60.0 LOCALIZED EDEMA: ICD-10-CM

## 2018-01-02 DIAGNOSIS — Z51.5 ENCOUNTER FOR PALLIATIVE CARE: ICD-10-CM

## 2018-01-02 DIAGNOSIS — C61 MALIGNANT NEOPLASM OF PROSTATE: ICD-10-CM

## 2018-01-02 DIAGNOSIS — R29.898 OTHER SYMPTOMS AND SIGNS INVOLVING THE MUSCULOSKELETAL SYSTEM: ICD-10-CM

## 2018-01-02 DIAGNOSIS — R33.9 RETENTION OF URINE, UNSPECIFIED: ICD-10-CM

## 2018-01-02 DIAGNOSIS — R65.10 SYSTEMIC INFLAMMATORY RESPONSE SYNDROME (SIRS) OF NON-INFECTIOUS ORIGIN WITHOUT ACUTE ORGAN DYSFUNCTION: ICD-10-CM

## 2018-01-02 LAB
CULTURE RESULTS: NO GROWTH — SIGNIFICANT CHANGE UP
SPECIMEN SOURCE: SIGNIFICANT CHANGE UP

## 2018-01-02 PROCEDURE — 93971 EXTREMITY STUDY: CPT | Mod: 26

## 2018-01-02 PROCEDURE — 99223 1ST HOSP IP/OBS HIGH 75: CPT

## 2018-01-02 PROCEDURE — 99233 SBSQ HOSP IP/OBS HIGH 50: CPT

## 2018-01-02 RX ORDER — OXYCODONE HYDROCHLORIDE 5 MG/1
5 TABLET ORAL EVERY 4 HOURS
Qty: 0 | Refills: 0 | Status: DISCONTINUED | OUTPATIENT
Start: 2018-01-02 | End: 2018-01-05

## 2018-01-02 RX ORDER — ENOXAPARIN SODIUM 100 MG/ML
40 INJECTION SUBCUTANEOUS DAILY
Qty: 0 | Refills: 0 | Status: DISCONTINUED | OUTPATIENT
Start: 2018-01-02 | End: 2018-01-05

## 2018-01-02 RX ORDER — PIPERACILLIN AND TAZOBACTAM 4; .5 G/20ML; G/20ML
3.38 INJECTION, POWDER, LYOPHILIZED, FOR SOLUTION INTRAVENOUS EVERY 8 HOURS
Qty: 0 | Refills: 0 | Status: DISCONTINUED | OUTPATIENT
Start: 2018-01-02 | End: 2018-01-05

## 2018-01-02 RX ORDER — DEXAMETHASONE 0.5 MG/5ML
4 ELIXIR ORAL EVERY 8 HOURS
Qty: 0 | Refills: 0 | Status: DISCONTINUED | OUTPATIENT
Start: 2018-01-02 | End: 2018-01-04

## 2018-01-02 RX ORDER — INFLUENZA VIRUS VACCINE 15; 15; 15; 15 UG/.5ML; UG/.5ML; UG/.5ML; UG/.5ML
0.5 SUSPENSION INTRAMUSCULAR ONCE
Qty: 0 | Refills: 0 | Status: COMPLETED | OUTPATIENT
Start: 2018-01-02 | End: 2018-01-02

## 2018-01-02 RX ORDER — TAMSULOSIN HYDROCHLORIDE 0.4 MG/1
1 CAPSULE ORAL
Qty: 0 | Refills: 0 | COMMUNITY

## 2018-01-02 RX ORDER — PANTOPRAZOLE SODIUM 20 MG/1
40 TABLET, DELAYED RELEASE ORAL
Qty: 0 | Refills: 0 | Status: DISCONTINUED | OUTPATIENT
Start: 2018-01-02 | End: 2018-01-05

## 2018-01-02 RX ORDER — ACETAMINOPHEN 500 MG
650 TABLET ORAL EVERY 6 HOURS
Qty: 0 | Refills: 0 | Status: DISCONTINUED | OUTPATIENT
Start: 2018-01-02 | End: 2018-01-05

## 2018-01-02 RX ORDER — DEXAMETHASONE 0.5 MG/5ML
4 ELIXIR ORAL EVERY 6 HOURS
Qty: 0 | Refills: 0 | Status: DISCONTINUED | OUTPATIENT
Start: 2018-01-02 | End: 2018-01-02

## 2018-01-02 RX ORDER — TAMSULOSIN HYDROCHLORIDE 0.4 MG/1
0.4 CAPSULE ORAL AT BEDTIME
Qty: 0 | Refills: 0 | Status: DISCONTINUED | OUTPATIENT
Start: 2018-01-02 | End: 2018-01-05

## 2018-01-02 RX ADMIN — PIPERACILLIN AND TAZOBACTAM 25 GRAM(S): 4; .5 INJECTION, POWDER, LYOPHILIZED, FOR SOLUTION INTRAVENOUS at 18:15

## 2018-01-02 RX ADMIN — PIPERACILLIN AND TAZOBACTAM 25 GRAM(S): 4; .5 INJECTION, POWDER, LYOPHILIZED, FOR SOLUTION INTRAVENOUS at 11:00

## 2018-01-02 RX ADMIN — Medication 4 MILLIGRAM(S): at 11:00

## 2018-01-02 RX ADMIN — Medication 650 MILLIGRAM(S): at 03:08

## 2018-01-02 RX ADMIN — Medication 4 MILLIGRAM(S): at 06:23

## 2018-01-02 RX ADMIN — PANTOPRAZOLE SODIUM 40 MILLIGRAM(S): 20 TABLET, DELAYED RELEASE ORAL at 06:48

## 2018-01-02 RX ADMIN — Medication 4 MILLIGRAM(S): at 22:43

## 2018-01-02 RX ADMIN — TAMSULOSIN HYDROCHLORIDE 0.4 MILLIGRAM(S): 0.4 CAPSULE ORAL at 22:43

## 2018-01-02 RX ADMIN — ENOXAPARIN SODIUM 40 MILLIGRAM(S): 100 INJECTION SUBCUTANEOUS at 11:00

## 2018-01-02 NOTE — H&P ADULT - HISTORY OF PRESENT ILLNESS
62 M w/ stage IV prostate cancer w/ metastatic disease to the spine and liver p/w worsening b/l LE weakness today. Pt follows w/ Dr. Oshea at [facility] and was treated Cabazitaxel previously and was started on Xtandi which he has not been able to tolerate due to back muscle spasms and fatigue. He has self discontinued it due to the side effects but his wife wanted him to take another dose yesterday which worsened his symptoms. Per outpatient record review, pt has had weakness in his LE chronically but it worsened to the point where he is unable to stand. He also endorses urinary and bowel incontinence today as well as urinary retention while in the ED requiring catherization. He denies any focal back pain but has chronic back muscle spasms that he takes oxycodone for. Denies any cough, nausea, vomiting, hematuria, or dysuria. His bowel movement today was loose but not watery. No recent abx use or abd pain. No sick contact or recent travel.     Vital Signs Last 24 Hrs  T(C): 36.9 (01 Jan 2018 22:20), Max: 39.1 (01 Jan 2018 15:00)  T(F): 98.4 (01 Jan 2018 22:20), Max: 102.4 (01 Jan 2018 15:00)  HR: 97 (01 Jan 2018 22:20) (97 - 120)  BP: 133/85 (01 Jan 2018 22:20) (128/85 - 157/84)  RR: 18 (01 Jan 2018 22:20) (18 - 20)  SpO2: 100% (01 Jan 2018 22:20) (98% - 100%)    MRI revealed extensive metastatic osseous disease but no spinal cord compression. Pt seen by NSGY with no acute surgical intervention indicated. 62 M w/ stage IV prostate cancer w/ metastatic disease to the spine and liver p/w worsening b/l LE weakness today. Pt follows w/ Dr. Oshea at [facility] and was treated Cabazitaxel previously and was started on Xtandi which he has not been able to tolerate due to back muscle spasms and fatigue. He has self discontinued it due to the side effects but his wife wanted him to take another dose yesterday which worsened his symptoms. Per outpatient record review, pt has had weakness in his LE chronically but it worsened to the point where he is unable to stand. He also endorses urinary and bowel incontinence today as well as urinary retention while in the ED requiring catherization. He denies any focal back pain but has chronic back muscle spasms that he takes oxycodone for. Denies any cough, nausea, vomiting, hematuria, or dysuria. His bowel movement today was loose but not watery. No recent abx use or abd pain. Endorses taking metamucil at home. No sick contact or recent travel.     Vital Signs Last 24 Hrs  T(C): 36.9 (01 Jan 2018 22:20), Max: 39.1 (01 Jan 2018 15:00)  T(F): 98.4 (01 Jan 2018 22:20), Max: 102.4 (01 Jan 2018 15:00)  HR: 97 (01 Jan 2018 22:20) (97 - 120)  BP: 133/85 (01 Jan 2018 22:20) (128/85 - 157/84)  RR: 18 (01 Jan 2018 22:20) (18 - 20)  SpO2: 100% (01 Jan 2018 22:20) (98% - 100%)    MRI revealed extensive metastatic osseous disease but no spinal cord compression. Pt seen by NSGY with no acute surgical intervention indicated.

## 2018-01-02 NOTE — H&P ADULT - PROBLEM SELECTOR PLAN 4
-Pt w/ chronic LLE edema, not worse from baseline. US in 2016 negative for DVT but did reveal large pelvic adenopathy with compression of the left external iliac vein. -Required straight cath while in ED. Will obtain post-void bladder scan and place marina if > 250cc urine

## 2018-01-02 NOTE — H&P ADULT - NSHPREVIEWOFSYSTEMS_GEN_ALL_CORE
CONSTITUTIONAL:  +fever, fatigue, weakness  HEENT:  Eyes:  No visual loss, blurred vision, double vision or yellow sclerae. Ears, Nose, Throat:  No hearing loss, sneezing, congestion, runny nose or sore throat.  SKIN:  No rash or itching.  CARDIOVASCULAR:  No chest pain, chest pressure or chest discomfort. No palpitations or edema.  RESPIRATORY:  No shortness of breath, cough or sputum.  GASTROINTESTINAL:  No nausea, vomiting or abd pain. +loose stool x1 today  GENITOURINARY:  Denies hematuria, dysuria. +urinary incontinence  NEUROLOGICAL:  No headache, dizziness, syncope. +b/l LE weakness, no numbness or tingling  MUSCULOSKELETAL:  +back muscle spasms, b/l LE weakness  HEMATOLOGIC:  No bleeding or bruising.

## 2018-01-02 NOTE — H&P ADULT - PROBLEM SELECTOR PLAN 2
-Onc consult in AM. Pt was previously referred to hospice and Palliative care, but no-showed to appointment. Will consult Palliative while inpatient.  -Pain control w/ oxycodone.  -c/w flomax -Onc consult in AM. Pt was previously referred to hospice and Palliative care, but no-showed to appointment. Will consult Palliative while inpatient.  -Pain control w/ oxycodone.  -elevated alk phos likely 2/2 to osseous mets  -c/w flomax
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
## 2018-01-02 NOTE — CONSULT NOTE ADULT - PROBLEM SELECTOR RECOMMENDATION 3
Improved with decadron.   Would consult rad onc for possible RT to the thoracic spine. Will email Dr. Morrissey, pt's previous rad onc attending

## 2018-01-02 NOTE — H&P ADULT - PROBLEM SELECTOR PLAN 1
-Likely 2/2 to progression of metastatic disease. No cord compression seen on MRI. NSGY consulted, recs appreciated. Will start pt on Decadron 4q6h. PT consult. Pain control w/ oxycodone.   -Per outpatient review, pt has hx of palliative RT but declined further follow-up. Will d/w onc regarding further RT vs. Palliative chemo. -Likely 2/2 to progression of metastatic disease. No cord compression seen on MRI. NSGY consulted, recs appreciated. Will start pt on Decadron 4q6h and protonix for GI ppx. PT consult. Pain control w/ oxycodone.   -Per outpatient review, pt has hx of palliative RT but declined further follow-up. Will d/w onc regarding further RT vs. Palliative chemo.

## 2018-01-02 NOTE — CONSULT NOTE ADULT - ASSESSMENT
62 M w/ metastatic prostate ca, multiple lines of systemic therapy, a/w worsening back pain and inability to ambulate.

## 2018-01-02 NOTE — H&P ADULT - ASSESSMENT
62 M w/ stage IV prostate cancer w/ metastatic disease to the spine and liver p/w worsening b/l LE weakness likely 2/2 to progression of underlying metastatic disease. Pt also meets SIRS criteria w/ fever, leukocytosis, and tachycardia w/ no clear source of infection.

## 2018-01-02 NOTE — H&P ADULT - PROBLEM SELECTOR PLAN 3
-Fever may be related to underlying malignancy rather than infectious process. Blood/urine cx pending. UA/RVP/CXR unremarkable. Pt w/ only 1 loose stool in the setting of laxative use, unlikely to be intraabdominal pathology. Will monitor off abx at this time. Hold further laxatives. -Fever may be related to underlying malignancy rather than infectious process. Blood/urine cx pending. UA/RVP/CXR unremarkable. Pt w/ only 1 loose stool in the setting of laxative use, unlikely to be intraabdominal pathology. Start empiric zosyn until cultures negative

## 2018-01-02 NOTE — H&P ADULT - PMH
HTN (hypertension)  5 years  Prostate cancer  2012 - s/p radical prostatectomy 2013 receives Lupron injections every 4 months  Sciatic Nerve Disease  since 2010  Spinal stenosis

## 2018-01-02 NOTE — PROGRESS NOTE ADULT - SUBJECTIVE AND OBJECTIVE BOX
Patient is a 62y old  Male who presents with a chief complaint of blood in stool (02 Jan 2018 07:59)    HPI: Pt seen in bed. Reports improvement in generalized weakness and back spasms.    Vital Signs Last 24 Hrs  T(C): 37 (02 Jan 2018 09:37), Max: 39.1 (01 Jan 2018 15:00)  T(F): 98.6 (02 Jan 2018 09:37), Max: 102.4 (01 Jan 2018 15:00)  HR: 102 (02 Jan 2018 09:37) (84 - 120)  BP: 118/69 (02 Jan 2018 09:37) (118/69 - 157/84)  BP(mean): --  RR: 18 (02 Jan 2018 09:37) (18 - 20)  SpO2: 97% (02 Jan 2018 09:37) (97% - 100%)                          10.4   10.5  )-----------( 512      ( 02 Jan 2018 10:17 )             31.7     01-02    135  |  100  |  17  ----------------------------<  157<H>  4.8   |  22  |  0.86    Ca    9.5      02 Jan 2018 10:16    TPro  7.2  /  Alb  3.2<L>  /  TBili  0.4  /  DBili  x   /  AST  46<H>  /  ALT  32  /  AlkPhos  268<H>  01-01    MEDICATIONS  (STANDING):  dexamethasone  Injectable 4 milliGRAM(s) IV Push every 6 hours  enoxaparin Injectable 40 milliGRAM(s) SubCutaneous daily  pantoprazole    Tablet 40 milliGRAM(s) Oral before breakfast  piperacillin/tazobactam IVPB. 3.375 Gram(s) IV Intermittent every 8 hours  tamsulosin 0.4 milliGRAM(s) Oral at bedtime    MEDICATIONS  (PRN):  acetaminophen   Tablet 650 milliGRAM(s) Oral every 6 hours PRN For Temp greater than 38 C (100.4 F)  oxyCODONE    IR 5 milliGRAM(s) Oral every 4 hours PRN Severe Pain (7 - 10) CC: Weakness.    HPI: Pt seen in bed. Reports improvement in generalized weakness and back spasms.    Vital Signs Last 24 Hrs  T(C): 37 (02 Jan 2018 09:37), Max: 39.1 (01 Jan 2018 15:00)  T(F): 98.6 (02 Jan 2018 09:37), Max: 102.4 (01 Jan 2018 15:00)  HR: 102 (02 Jan 2018 09:37) (84 - 120)  BP: 118/69 (02 Jan 2018 09:37) (118/69 - 157/84)  BP(mean): --  RR: 18 (02 Jan 2018 09:37) (18 - 20)  SpO2: 97% (02 Jan 2018 09:37) (97% - 100%)                          10.4   10.5  )-----------( 512      ( 02 Jan 2018 10:17 )             31.7     01-02    135  |  100  |  17  ----------------------------<  157<H>  4.8   |  22  |  0.86    Ca    9.5      02 Jan 2018 10:16    TPro  7.2  /  Alb  3.2<L>  /  TBili  0.4  /  DBili  x   /  AST  46<H>  /  ALT  32  /  AlkPhos  268<H>  01-01    MEDICATIONS  (STANDING):  dexamethasone  Injectable 4 milliGRAM(s) IV Push every 6 hours  enoxaparin Injectable 40 milliGRAM(s) SubCutaneous daily  pantoprazole    Tablet 40 milliGRAM(s) Oral before breakfast  piperacillin/tazobactam IVPB. 3.375 Gram(s) IV Intermittent every 8 hours  tamsulosin 0.4 milliGRAM(s) Oral at bedtime    MEDICATIONS  (PRN):  acetaminophen   Tablet 650 milliGRAM(s) Oral every 6 hours PRN For Temp greater than 38 C (100.4 F)  oxyCODONE    IR 5 milliGRAM(s) Oral every 4 hours PRN Severe Pain (7 - 10)
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
## 2018-01-02 NOTE — H&P ADULT - NSHPLABSRESULTS_GEN_ALL_CORE
Labs and imaging personally reviewed                            10.6   13.1  )-----------( 637      ( 2018 15:21 )             32.0         135  |  98  |  18  ----------------------------<  104<H>  5.1   |  25  |  1.03    Ca    10.0      2018 15:21    TPro  7.2  /  Alb  3.2<L>  /  TBili  0.4  /  DBili  x   /  AST  46<H>  /  ALT  32  /  AlkPhos  268<H>            LIVER FUNCTIONS - ( 2018 15:21 )  Alb: 3.2 g/dL / Pro: 7.2 g/dL / ALK PHOS: 268 U/L / ALT: 32 U/L RC / AST: 46 U/L / GGT: x           Urinalysis Basic - ( 2018 15:55 )    Color: Yellow / Appearance: Clear / S.019 / pH: x  Gluc: x / Ketone: Negative  / Bili: Negative / Urobili: Negative   Blood: x / Protein: 100 mg/dL / Nitrite: Negative   Leuk Esterase: Negative / RBC: 0-2 /HPF / WBC 0-2 /HPF   Sq Epi: x / Non Sq Epi: Occasional /HPF / Bacteria: Few /HPF      PT/INR - ( 2018 15:21 )   PT: 14.4 sec;   INR: 1.31 ratio         PTT - ( 2018 15:21 )  PTT:40.0 sec    EKG: sinus tach, no ST/T wave changes    CXR: clear lungs    < from: MR Lumbar Spine No Cont (18 @ 21:55) >    Cervical spine: Extensive osseous metastasis. Spinal cord signal   abnormality at C5 level--neoplastic or white matter disease.    Thoracic spine: Extensive osseous metastasis with spinal canal stenosis   at T1, T2, T5, T6 and T7 level due to anterior or posterior epidural   masses.    Lumbar spine: Extensive osseous metastasis slightly more progressed since   prior. Decreased spinal canal encroachment by anterior epidural mass at   L2. Degenerative spinal canal stenosis at L3-4 and L4-5.    < end of copied text >

## 2018-01-02 NOTE — CONSULT NOTE ADULT - PROBLEM SELECTOR RECOMMENDATION 9
I spoke with patient in general terms regarding his disease. It seems to me that he continues to look for other treatment options. I am not sure if he has good insight into his prognosis. His wife was not present at the time of our conversation and therefore I chose to not go into detail. He called her on the phone and I arranged for meeting tomorrow at 3 pm to discuss further.  Recommendation from out pt oncologist, Dr. Oshea was to consider home hospice. Pt does not seem to have understood this recommendation.   I do think this would be the best way to manage pt's pain.

## 2018-01-02 NOTE — H&P ADULT - PROBLEM SELECTOR PLAN 5
-Lovenox  -Regular diet -Pt w/ chronic LLE edema, not worse from baseline. US in 2016 negative for DVT but did reveal large pelvic adenopathy with compression of the left external iliac vein.

## 2018-01-03 DIAGNOSIS — F44.89 OTHER DISSOCIATIVE AND CONVERSION DISORDERS: ICD-10-CM

## 2018-01-03 LAB
ANION GAP SERPL CALC-SCNC: 16 MMOL/L — SIGNIFICANT CHANGE UP (ref 5–17)
BASOPHILS # BLD AUTO: 0 K/UL — SIGNIFICANT CHANGE UP (ref 0–0.2)
BASOPHILS NFR BLD AUTO: 0 % — SIGNIFICANT CHANGE UP (ref 0–2)
BUN SERPL-MCNC: 26 MG/DL — HIGH (ref 7–23)
CALCIUM SERPL-MCNC: 9.8 MG/DL — SIGNIFICANT CHANGE UP (ref 8.4–10.5)
CHLORIDE SERPL-SCNC: 99 MMOL/L — SIGNIFICANT CHANGE UP (ref 96–108)
CO2 SERPL-SCNC: 21 MMOL/L — LOW (ref 22–31)
CREAT SERPL-MCNC: 0.92 MG/DL — SIGNIFICANT CHANGE UP (ref 0.5–1.3)
EOSINOPHIL # BLD AUTO: 0 K/UL — SIGNIFICANT CHANGE UP (ref 0–0.5)
EOSINOPHIL NFR BLD AUTO: 0 — SIGNIFICANT CHANGE UP
GLUCOSE SERPL-MCNC: 129 MG/DL — HIGH (ref 70–99)
HCT VFR BLD CALC: 28.5 % — LOW (ref 39–50)
HGB BLD-MCNC: 9.2 G/DL — LOW (ref 13–17)
IMM GRANULOCYTES NFR BLD AUTO: 0.4 % — SIGNIFICANT CHANGE UP (ref 0–1.5)
LYMPHOCYTES # BLD AUTO: 0.75 K/UL — LOW (ref 1–3.3)
LYMPHOCYTES # BLD AUTO: 7.7 % — LOW (ref 13–44)
MCHC RBC-ENTMCNC: 26 PG — LOW (ref 27–34)
MCHC RBC-ENTMCNC: 32.3 GM/DL — SIGNIFICANT CHANGE UP (ref 32–36)
MCV RBC AUTO: 80.5 FL — SIGNIFICANT CHANGE UP (ref 80–100)
MONOCYTES # BLD AUTO: 0.5 K/UL — SIGNIFICANT CHANGE UP (ref 0–0.9)
MONOCYTES NFR BLD AUTO: 5.1 % — SIGNIFICANT CHANGE UP (ref 2–14)
NEUTROPHILS # BLD AUTO: 8.43 K/UL — HIGH (ref 1.8–7.4)
NEUTROPHILS NFR BLD AUTO: 86.8 % — HIGH (ref 43–77)
PLATELET # BLD AUTO: 634 K/UL — HIGH (ref 150–400)
POTASSIUM SERPL-MCNC: 5 MMOL/L — SIGNIFICANT CHANGE UP (ref 3.5–5.3)
POTASSIUM SERPL-SCNC: 5 MMOL/L — SIGNIFICANT CHANGE UP (ref 3.5–5.3)
RBC # BLD: 3.54 M/UL — LOW (ref 4.2–5.8)
RBC # FLD: 15.2 % — HIGH (ref 10.3–14.5)
SODIUM SERPL-SCNC: 136 MMOL/L — SIGNIFICANT CHANGE UP (ref 135–145)
WBC # BLD: 9.72 K/UL — SIGNIFICANT CHANGE UP (ref 3.8–10.5)
WBC # FLD AUTO: 9.72 K/UL — SIGNIFICANT CHANGE UP (ref 3.8–10.5)

## 2018-01-03 PROCEDURE — 99233 SBSQ HOSP IP/OBS HIGH 50: CPT

## 2018-01-03 PROCEDURE — 99497 ADVNCD CARE PLAN 30 MIN: CPT

## 2018-01-03 RX ORDER — CYCLOBENZAPRINE HYDROCHLORIDE 10 MG/1
5 TABLET, FILM COATED ORAL THREE TIMES A DAY
Qty: 0 | Refills: 0 | Status: DISCONTINUED | OUTPATIENT
Start: 2018-01-03 | End: 2018-01-05

## 2018-01-03 RX ADMIN — ENOXAPARIN SODIUM 40 MILLIGRAM(S): 100 INJECTION SUBCUTANEOUS at 11:36

## 2018-01-03 RX ADMIN — Medication 4 MILLIGRAM(S): at 21:28

## 2018-01-03 RX ADMIN — OXYCODONE HYDROCHLORIDE 5 MILLIGRAM(S): 5 TABLET ORAL at 10:53

## 2018-01-03 RX ADMIN — PIPERACILLIN AND TAZOBACTAM 25 GRAM(S): 4; .5 INJECTION, POWDER, LYOPHILIZED, FOR SOLUTION INTRAVENOUS at 18:38

## 2018-01-03 RX ADMIN — TAMSULOSIN HYDROCHLORIDE 0.4 MILLIGRAM(S): 0.4 CAPSULE ORAL at 21:28

## 2018-01-03 RX ADMIN — PIPERACILLIN AND TAZOBACTAM 25 GRAM(S): 4; .5 INJECTION, POWDER, LYOPHILIZED, FOR SOLUTION INTRAVENOUS at 11:36

## 2018-01-03 RX ADMIN — PANTOPRAZOLE SODIUM 40 MILLIGRAM(S): 20 TABLET, DELAYED RELEASE ORAL at 05:31

## 2018-01-03 RX ADMIN — Medication 4 MILLIGRAM(S): at 05:31

## 2018-01-03 RX ADMIN — OXYCODONE HYDROCHLORIDE 5 MILLIGRAM(S): 5 TABLET ORAL at 11:50

## 2018-01-03 RX ADMIN — CYCLOBENZAPRINE HYDROCHLORIDE 5 MILLIGRAM(S): 10 TABLET, FILM COATED ORAL at 20:16

## 2018-01-03 RX ADMIN — PIPERACILLIN AND TAZOBACTAM 25 GRAM(S): 4; .5 INJECTION, POWDER, LYOPHILIZED, FOR SOLUTION INTRAVENOUS at 05:27

## 2018-01-03 NOTE — PHYSICAL THERAPY INITIAL EVALUATION ADULT - PERTINENT HX OF CURRENT PROBLEM, REHAB EVAL
62 y.o. male w/ stage IV prostate cancer w/ metastatic disease to the spine & liver p/w worsening b/l LE weakness. Pt follows w/ Dr. Oshea at [facility] and was treated Cabazitaxel previously and was started on Xtandi which he has not been able to tolerate due to back muscle spasms and fatigue. He also endorses urinary and bowel incontinence as well as urinary retention while in the ED requiring catherization. Pt has chronic back muscle spasms that he takes oxycodone for. Continued.

## 2018-01-03 NOTE — PROGRESS NOTE ADULT - SUBJECTIVE AND OBJECTIVE BOX
Chief Complaint: metastatic prostate ca     INTERVAL HPI/OVERNIGHT EVENTS: Wife at bedside. Pt worked with PT today, but could only ambulate a little with walker in the room. MARCY was recommended. Sitting in a chair currently.     MEDICATIONS  (STANDING):  dexamethasone  Injectable 4 milliGRAM(s) IV Push every 8 hours  enoxaparin Injectable 40 milliGRAM(s) SubCutaneous daily  pantoprazole    Tablet 40 milliGRAM(s) Oral before breakfast  piperacillin/tazobactam IVPB. 3.375 Gram(s) IV Intermittent every 8 hours  tamsulosin 0.4 milliGRAM(s) Oral at bedtime    MEDICATIONS  (PRN):  acetaminophen   Tablet 650 milliGRAM(s) Oral every 6 hours PRN For Temp greater than 38 C (100.4 F)  cyclobenzaprine 5 milliGRAM(s) Oral three times a day PRN Muscle Spasm  oxyCODONE    IR 5 milliGRAM(s) Oral every 4 hours PRN Severe Pain (7 - 10)      Allergies    dust (Sneezing)  No Known Drug Allergies    Intolerances        ROS: as above     Vital Signs Last 24 Hrs  T(C): 37.2 (03 Jan 2018 16:48), Max: 37.2 (03 Jan 2018 16:48)  T(F): 99 (03 Jan 2018 16:48), Max: 99 (03 Jan 2018 16:48)  HR: 101 (03 Jan 2018 16:48) (98 - 106)  BP: 126/70 (03 Jan 2018 16:48) (116/80 - 134/80)  BP(mean): --  RR: 18 (03 Jan 2018 16:48) (18 - 20)  SpO2: 99% (03 Jan 2018 16:48) (97% - 100%)    Physical Exam:   AAO x 2-3, NAD   RRR S1S2  CTA b/l   soft NTNDBS+  no c/c/e    LABS:                        9.2    9.72  )-----------( 634      ( 03 Jan 2018 10:15 )             28.5     01-03    136  |  99  |  26<H>  ----------------------------<  129<H>  5.0   |  21<L>  |  0.92    Ca    9.8      03 Jan 2018 10:13

## 2018-01-03 NOTE — PHYSICAL THERAPY INITIAL EVALUATION ADULT - ADDITIONAL COMMENTS
(-)CXR 1/1/18. +MR Cervical spine 1/1/18: Extensive osseous metastasis. Spinal cord signal abnormality at C5 level--neoplastic or white matter disease. +MR Thoracic spine 1/1/18: Extensive osseous metastasis with spinal canal stenosis at T1, T2, T5, T6 and T7 level due to anterior or posterior epidural masses. +MR Lumbar spine 1/1/18: Extensive osseous metastasis slightly more progressed since prior. Decreased spinal canal encroachment by anterior epidural mass at L2. Degenerative spinal canal stenosis at L3-4 and L4-5. (-)CXR 1/1/18. +MR Cervical spine 1/1/18: Extensive osseous metastasis. Spinal cord signal abnormality at C5 level--neoplastic or white matter disease. +MR Thoracic spine 1/1/18: Extensive osseous metastasis with spinal canal stenosis at T1, T2, T5, T6 and T7 level due to anterior or posterior epidural masses. +MR Lumbar spine 1/1/18: Extensive osseous metastasis slightly more progressed since prior. Decreased spinal canal encroachment by anterior epidural mass at L2. Degenerative spinal canal stenosis at L3-4 and L4-5.    Pt states he lives with his wife and 2 children in a private home with 3 steps to enter and 2 flights of stairs inside with a landing in between, no HRs. Pt was independent with all ADLs and ambulation. Pt uses a cane for ambulation.

## 2018-01-03 NOTE — PROGRESS NOTE ADULT - SUBJECTIVE AND OBJECTIVE BOX
INTERVAL HPI/OVERNIGHT EVENTS: more confused, word finding difficulties, denies pain      Allergies    dust (Sneezing)  No Known Drug Allergies    Intolerances      ADVANCE DIRECTIVES:    DNR YES  MOLST [ ] YES [x ] NO     MEDICATIONS  (STANDING):  dexamethasone  Injectable 4 milliGRAM(s) IV Push every 8 hours  enoxaparin Injectable 40 milliGRAM(s) SubCutaneous daily  pantoprazole    Tablet 40 milliGRAM(s) Oral before breakfast  piperacillin/tazobactam IVPB. 3.375 Gram(s) IV Intermittent every 8 hours  tamsulosin 0.4 milliGRAM(s) Oral at bedtime    MEDICATIONS  (PRN):  acetaminophen   Tablet 650 milliGRAM(s) Oral every 6 hours PRN For Temp greater than 38 C (100.4 F)  oxyCODONE    IR 5 milliGRAM(s) Oral every 4 hours PRN Severe Pain (7 - 10)      PRESENT SYMPTOMS:  Source: [x ] Patient   [ ] Family   [ ] Team     Pain:                        [x ] No [ ] Yes             [ ] Mild [ ] Moderate [ ] Severe    Onset -  Location -  Duration -  Character -  Alleviating/Aggravating -  Radiation -  Timing -    Dyspnea:                [x ] No [ ] Yes             [ ] Mild [ ] Moderate [ ] Severe    Anxiety:                  [ ] No [x ] Yes             [x ] Mild [ ] Moderate [ ] Severe    Fatigue:                  [ ] No [x ] Yes             [x ] Mild [ ] Moderate [ ] Severe    Nausea:                  [x ] No [ ] Yes             [ ] Mild [ ] Moderate [ ] Severe    Loss of appetite:   [ ] No [x ] Yes             [x ] Mild [ ] Moderate [ ] Severe    Constipation:        [ x] No [ ] Yes             [ ] Mild [ ] Moderate [ ] Severe    Other Symptoms:  [ x] All other review of systems negative   [ ] Unable to obtain due to poor mentation     Karnofsky Performance Score/Palliative Performance Status Version 2:    30     %    PHYSICAL EXAM:  Vital Signs Last 24 Hrs  T(C): 36.6 (03 Jan 2018 08:49), Max: 37 (02 Jan 2018 16:33)  T(F): 97.8 (03 Jan 2018 08:49), Max: 98.6 (02 Jan 2018 16:33)  HR: 106 (03 Jan 2018 12:44) (98 - 106)  BP: 127/90 (03 Jan 2018 12:44) (116/80 - 134/80)  BP(mean): --  RR: 20 (03 Jan 2018 08:49) (18 - 20)  SpO2: 100% (03 Jan 2018 12:44) (97% - 100%) I&O's Summary    02 Jan 2018 07:01  -  03 Jan 2018 07:00  --------------------------------------------------------  IN: 580 mL / OUT: 200 mL / NET: 380 mL    03 Jan 2018 07:01  -  03 Jan 2018 16:17  --------------------------------------------------------  IN: 460 mL / OUT: 300 mL / NET: 160 mL        General:  [x ] Alert  [x ] Oriented x   2-3 but intermittently confused  [ ] Lethargic  [ ] Agitated   [ ] Cachexia   [ ] Unarousable  [x ] Verbal  [ ] Non-Verbal    HEENT:  [ ] Normal   [x ] Dry mouth   [ ] ET Tube    [ ] Trach  [ ] Oral lesions    Lungs:   [x ] Clear [ ] Tachypnea  [ ] Audible excessive secretions   [ ] Rhonchi        [ ] Right [ ] Left [ ] Bilateral  [ ] Crackles        [ ] Right [ ] Left [ ] Bilateral  [ ] Wheezing     [ ] Right [ ] Left [ ] Bilateral    Cardiovascular:  [ ] Regular [ ] Irregular [ x] Tachycardia   [ ] Bradycardia  [ ] Murmur [ ] Other    Abdomen: [x ] Soft  [ ] Distended   [ ] +BS  [ ] Non tender [ ] Tender  [ ]PEG   [ ]OGT/ NGT   Last BM:       Genitourinary: [ ] Normal [ x] Incontinent   [ ] Oliguria/Anuria   [ ] [place]    Musculoskeletal:  [ ] Normal   [x ] Weakness  [ ] Bedbound/Wheelchair bound [ ] Edema    Neurological: [ ] No focal deficits  [ x] Cognitive impairment  [ ] Dysphagia [ ] Dysarthria [ ] Paresis [ ] Other     Skin: [ x] Normal   [ ] Pressure ulcer(s)                  [ ] Rash    LABS:                        9.2    9.72  )-----------( 634      ( 03 Jan 2018 10:15 )             28.5     01-03    136  |  99  |  26<H>  ----------------------------<  129<H>  5.0   |  21<L>  |  0.92    Ca    9.8      03 Jan 2018 10:13            Shock: [ ] Septic [ ] Cardiogenic [ ] Neurologic [ ] Hypovolemic  Vasopressors x   Inotrophs x     Oral Intake: [ ] Unable/mouth care only [ ] Minimal [ ] Moderate [ x] Full Capability    Diet: [ ] NPO [ ] Tube feeds [ ] TPN [ ] Other     RADIOLOGY & ADDITIONAL STUDIES:    REFERRALS:   [ ] Chaplaincy  [ ] Hospice  [ ] Child Life  [ ] Social Work  [ ] Case management [ ] Holistic Therapy

## 2018-01-03 NOTE — PROGRESS NOTE ADULT - SUBJECTIVE AND OBJECTIVE BOX
CC: Weakness    HPI: Pt up in chair. Attempted to ambulate with PT- significant weakness. Pain controlled at this time.     Vital Signs Last 24 Hrs  T(C): 36.6 (03 Jan 2018 08:49), Max: 37 (02 Jan 2018 16:33)  T(F): 97.8 (03 Jan 2018 08:49), Max: 98.6 (02 Jan 2018 16:33)  HR: 106 (03 Jan 2018 12:44) (98 - 106)  BP: 127/90 (03 Jan 2018 12:44) (116/80 - 134/80)  BP(mean): --  RR: 20 (03 Jan 2018 08:49) (18 - 20)  SpO2: 100% (03 Jan 2018 12:44) (97% - 100%)                          9.2    9.72  )-----------( 634      ( 03 Jan 2018 10:15 )             28.5     01-03    136  |  99  |  26<H>  ----------------------------<  129<H>  5.0   |  21<L>  |  0.92    Ca    9.8      03 Jan 2018 10:13    TPro  7.2  /  Alb  3.2<L>  /  TBili  0.4  /  DBili  x   /  AST  46<H>  /  ALT  32  /  AlkPhos  268<H>  01-01    MEDICATIONS  (STANDING):  dexamethasone  Injectable 4 milliGRAM(s) IV Push every 8 hours  enoxaparin Injectable 40 milliGRAM(s) SubCutaneous daily  pantoprazole    Tablet 40 milliGRAM(s) Oral before breakfast  piperacillin/tazobactam IVPB. 3.375 Gram(s) IV Intermittent every 8 hours  tamsulosin 0.4 milliGRAM(s) Oral at bedtime    MEDICATIONS  (PRN):  acetaminophen   Tablet 650 milliGRAM(s) Oral every 6 hours PRN For Temp greater than 38 C (100.4 F)  oxyCODONE    IR 5 milliGRAM(s) Oral every 4 hours PRN Severe Pain (7 - 10)      MRI Cervical spine: Extensive osseous metastasis. Spinal cord signal abnormality at C5 level--neoplastic or white matter disease.    Thoracic spine: Extensive osseous metastasis with spinal canal stenosis at T1, T2, T5, T6 and T7 level due to anterior or posterior epidural masses.    Lumbar spine: Extensive osseous metastasis slightly more progressed since prior. Decreased spinal canal encroachment by anterior epidural mass at L2. Degenerative spinal canal stenosis at L3-4 and L4-5.

## 2018-01-04 DIAGNOSIS — R50.9 FEVER, UNSPECIFIED: ICD-10-CM

## 2018-01-04 DIAGNOSIS — G89.3 NEOPLASM RELATED PAIN (ACUTE) (CHRONIC): ICD-10-CM

## 2018-01-04 LAB
ANION GAP SERPL CALC-SCNC: 14 MMOL/L — SIGNIFICANT CHANGE UP (ref 5–17)
BUN SERPL-MCNC: 25 MG/DL — HIGH (ref 7–23)
CALCIUM SERPL-MCNC: 9.6 MG/DL — SIGNIFICANT CHANGE UP (ref 8.4–10.5)
CHLORIDE SERPL-SCNC: 101 MMOL/L — SIGNIFICANT CHANGE UP (ref 96–108)
CO2 SERPL-SCNC: 22 MMOL/L — SIGNIFICANT CHANGE UP (ref 22–31)
CREAT SERPL-MCNC: 1.05 MG/DL — SIGNIFICANT CHANGE UP (ref 0.5–1.3)
GLUCOSE SERPL-MCNC: 110 MG/DL — HIGH (ref 70–99)
HCT VFR BLD CALC: 30.6 % — LOW (ref 39–50)
HGB BLD-MCNC: 9.9 G/DL — LOW (ref 13–17)
MCHC RBC-ENTMCNC: 27.3 PG — SIGNIFICANT CHANGE UP (ref 27–34)
MCHC RBC-ENTMCNC: 32.3 GM/DL — SIGNIFICANT CHANGE UP (ref 32–36)
MCV RBC AUTO: 84.6 FL — SIGNIFICANT CHANGE UP (ref 80–100)
PLATELET # BLD AUTO: 607 K/UL — HIGH (ref 150–400)
POTASSIUM SERPL-MCNC: 4.7 MMOL/L — SIGNIFICANT CHANGE UP (ref 3.5–5.3)
POTASSIUM SERPL-SCNC: 4.7 MMOL/L — SIGNIFICANT CHANGE UP (ref 3.5–5.3)
RBC # BLD: 3.62 M/UL — LOW (ref 4.2–5.8)
RBC # FLD: 14.4 % — SIGNIFICANT CHANGE UP (ref 10.3–14.5)
SODIUM SERPL-SCNC: 137 MMOL/L — SIGNIFICANT CHANGE UP (ref 135–145)
WBC # BLD: 12 K/UL — HIGH (ref 3.8–10.5)
WBC # FLD AUTO: 12 K/UL — HIGH (ref 3.8–10.5)

## 2018-01-04 PROCEDURE — 99233 SBSQ HOSP IP/OBS HIGH 50: CPT

## 2018-01-04 RX ORDER — DEXAMETHASONE 0.5 MG/5ML
4 ELIXIR ORAL
Qty: 0 | Refills: 0 | Status: DISCONTINUED | OUTPATIENT
Start: 2018-01-04 | End: 2018-01-05

## 2018-01-04 RX ORDER — LACTULOSE 10 G/15ML
20 SOLUTION ORAL DAILY
Qty: 0 | Refills: 0 | Status: DISCONTINUED | OUTPATIENT
Start: 2018-01-04 | End: 2018-01-05

## 2018-01-04 RX ORDER — LACTULOSE 10 G/15ML
20 SOLUTION ORAL ONCE
Qty: 0 | Refills: 0 | Status: COMPLETED | OUTPATIENT
Start: 2018-01-04 | End: 2018-01-04

## 2018-01-04 RX ADMIN — ENOXAPARIN SODIUM 40 MILLIGRAM(S): 100 INJECTION SUBCUTANEOUS at 11:30

## 2018-01-04 RX ADMIN — Medication 4 MILLIGRAM(S): at 13:36

## 2018-01-04 RX ADMIN — CYCLOBENZAPRINE HYDROCHLORIDE 5 MILLIGRAM(S): 10 TABLET, FILM COATED ORAL at 11:30

## 2018-01-04 RX ADMIN — PIPERACILLIN AND TAZOBACTAM 25 GRAM(S): 4; .5 INJECTION, POWDER, LYOPHILIZED, FOR SOLUTION INTRAVENOUS at 01:47

## 2018-01-04 RX ADMIN — Medication 4 MILLIGRAM(S): at 18:08

## 2018-01-04 RX ADMIN — LACTULOSE 20 GRAM(S): 10 SOLUTION ORAL at 18:08

## 2018-01-04 RX ADMIN — PIPERACILLIN AND TAZOBACTAM 25 GRAM(S): 4; .5 INJECTION, POWDER, LYOPHILIZED, FOR SOLUTION INTRAVENOUS at 11:30

## 2018-01-04 RX ADMIN — CYCLOBENZAPRINE HYDROCHLORIDE 5 MILLIGRAM(S): 10 TABLET, FILM COATED ORAL at 01:46

## 2018-01-04 RX ADMIN — PANTOPRAZOLE SODIUM 40 MILLIGRAM(S): 20 TABLET, DELAYED RELEASE ORAL at 05:16

## 2018-01-04 RX ADMIN — Medication 650 MILLIGRAM(S): at 02:03

## 2018-01-04 RX ADMIN — Medication 4 MILLIGRAM(S): at 05:16

## 2018-01-04 RX ADMIN — OXYCODONE HYDROCHLORIDE 5 MILLIGRAM(S): 5 TABLET ORAL at 18:58

## 2018-01-04 RX ADMIN — PIPERACILLIN AND TAZOBACTAM 25 GRAM(S): 4; .5 INJECTION, POWDER, LYOPHILIZED, FOR SOLUTION INTRAVENOUS at 18:09

## 2018-01-04 RX ADMIN — CYCLOBENZAPRINE HYDROCHLORIDE 5 MILLIGRAM(S): 10 TABLET, FILM COATED ORAL at 21:44

## 2018-01-04 RX ADMIN — OXYCODONE HYDROCHLORIDE 5 MILLIGRAM(S): 5 TABLET ORAL at 18:08

## 2018-01-04 NOTE — PROGRESS NOTE ADULT - SUBJECTIVE AND OBJECTIVE BOX
CC: Weakness.     HPI: Febrile 100.6 overnight. Currently up in chair. Denies complaints. Pain controlled.     Vital Signs Last 24 Hrs  T(C): 36.7 (04 Jan 2018 07:59), Max: 38.1 (04 Jan 2018 01:59)  T(F): 98.1 (04 Jan 2018 07:59), Max: 100.6 (04 Jan 2018 01:59)  HR: 87 (04 Jan 2018 07:59) (87 - 115)  BP: 118/75 (04 Jan 2018 07:59) (118/75 - 126/70)  BP(mean): --  RR: 18 (04 Jan 2018 07:59) (16 - 18)  SpO2: 97% (04 Jan 2018 07:59) (97% - 99%)                            9.2    9.72  )-----------( 634      ( 03 Jan 2018 10:15 )             28.5     01-03    136  |  99  |  26<H>  ----------------------------<  129<H>  5.0   |  21<L>  |  0.92    Ca    9.8      03 Jan 2018 10:13    MEDICATIONS  (STANDING):  dexamethasone  Injectable 4 milliGRAM(s) IV Push every 8 hours  enoxaparin Injectable 40 milliGRAM(s) SubCutaneous daily  pantoprazole    Tablet 40 milliGRAM(s) Oral before breakfast  piperacillin/tazobactam IVPB. 3.375 Gram(s) IV Intermittent every 8 hours  tamsulosin 0.4 milliGRAM(s) Oral at bedtime    MEDICATIONS  (PRN):  acetaminophen   Tablet 650 milliGRAM(s) Oral every 6 hours PRN For Temp greater than 38 C (100.4 F)  cyclobenzaprine 5 milliGRAM(s) Oral three times a day PRN Muscle Spasm  oxyCODONE    IR 5 milliGRAM(s) Oral every 4 hours PRN Severe Pain (7 - 10)

## 2018-01-05 ENCOUNTER — TRANSCRIPTION ENCOUNTER (OUTPATIENT)
Age: 63
End: 2018-01-05

## 2018-01-05 VITALS
OXYGEN SATURATION: 96 % | TEMPERATURE: 98 F | DIASTOLIC BLOOD PRESSURE: 75 MMHG | HEART RATE: 88 BPM | RESPIRATION RATE: 18 BRPM | SYSTOLIC BLOOD PRESSURE: 111 MMHG

## 2018-01-05 PROCEDURE — 99239 HOSP IP/OBS DSCHRG MGMT >30: CPT

## 2018-01-05 PROCEDURE — 85730 THROMBOPLASTIN TIME PARTIAL: CPT

## 2018-01-05 PROCEDURE — 85610 PROTHROMBIN TIME: CPT

## 2018-01-05 PROCEDURE — 82330 ASSAY OF CALCIUM: CPT

## 2018-01-05 PROCEDURE — 87633 RESP VIRUS 12-25 TARGETS: CPT

## 2018-01-05 PROCEDURE — 87040 BLOOD CULTURE FOR BACTERIA: CPT

## 2018-01-05 PROCEDURE — 85027 COMPLETE CBC AUTOMATED: CPT

## 2018-01-05 PROCEDURE — 87086 URINE CULTURE/COLONY COUNT: CPT

## 2018-01-05 PROCEDURE — 93971 EXTREMITY STUDY: CPT

## 2018-01-05 PROCEDURE — 80053 COMPREHEN METABOLIC PANEL: CPT

## 2018-01-05 PROCEDURE — 93005 ELECTROCARDIOGRAM TRACING: CPT | Mod: XU

## 2018-01-05 PROCEDURE — 96375 TX/PRO/DX INJ NEW DRUG ADDON: CPT | Mod: XU

## 2018-01-05 PROCEDURE — 72141 MRI NECK SPINE W/O DYE: CPT

## 2018-01-05 PROCEDURE — 80048 BASIC METABOLIC PNL TOTAL CA: CPT

## 2018-01-05 PROCEDURE — 81001 URINALYSIS AUTO W/SCOPE: CPT

## 2018-01-05 PROCEDURE — 85014 HEMATOCRIT: CPT

## 2018-01-05 PROCEDURE — 87581 M.PNEUMON DNA AMP PROBE: CPT

## 2018-01-05 PROCEDURE — 97162 PT EVAL MOD COMPLEX 30 MIN: CPT

## 2018-01-05 PROCEDURE — 82565 ASSAY OF CREATININE: CPT

## 2018-01-05 PROCEDURE — 82803 BLOOD GASES ANY COMBINATION: CPT

## 2018-01-05 PROCEDURE — 84132 ASSAY OF SERUM POTASSIUM: CPT

## 2018-01-05 PROCEDURE — 51701 INSERT BLADDER CATHETER: CPT

## 2018-01-05 PROCEDURE — 72146 MRI CHEST SPINE W/O DYE: CPT

## 2018-01-05 PROCEDURE — 97110 THERAPEUTIC EXERCISES: CPT

## 2018-01-05 PROCEDURE — 97116 GAIT TRAINING THERAPY: CPT

## 2018-01-05 PROCEDURE — 99285 EMERGENCY DEPT VISIT HI MDM: CPT | Mod: 25

## 2018-01-05 PROCEDURE — 87798 DETECT AGENT NOS DNA AMP: CPT

## 2018-01-05 PROCEDURE — 71045 X-RAY EXAM CHEST 1 VIEW: CPT

## 2018-01-05 PROCEDURE — 87486 CHLMYD PNEUM DNA AMP PROBE: CPT

## 2018-01-05 PROCEDURE — 82435 ASSAY OF BLOOD CHLORIDE: CPT

## 2018-01-05 PROCEDURE — 72148 MRI LUMBAR SPINE W/O DYE: CPT

## 2018-01-05 PROCEDURE — 83605 ASSAY OF LACTIC ACID: CPT

## 2018-01-05 PROCEDURE — 82947 ASSAY GLUCOSE BLOOD QUANT: CPT

## 2018-01-05 PROCEDURE — 96374 THER/PROPH/DIAG INJ IV PUSH: CPT | Mod: XU

## 2018-01-05 PROCEDURE — 84295 ASSAY OF SERUM SODIUM: CPT

## 2018-01-05 RX ORDER — CYCLOBENZAPRINE HYDROCHLORIDE 10 MG/1
1 TABLET, FILM COATED ORAL
Qty: 0 | Refills: 0 | COMMUNITY
Start: 2018-01-05

## 2018-01-05 RX ORDER — PANTOPRAZOLE SODIUM 20 MG/1
1 TABLET, DELAYED RELEASE ORAL
Qty: 0 | Refills: 0 | COMMUNITY
Start: 2018-01-05

## 2018-01-05 RX ORDER — ACETAMINOPHEN 500 MG
650 TABLET ORAL ONCE
Qty: 0 | Refills: 0 | Status: COMPLETED | OUTPATIENT
Start: 2018-01-05 | End: 2018-01-05

## 2018-01-05 RX ORDER — LACTULOSE 10 G/15ML
30 SOLUTION ORAL
Qty: 0 | Refills: 0 | COMMUNITY
Start: 2018-01-05

## 2018-01-05 RX ORDER — DEXAMETHASONE 0.5 MG/5ML
1 ELIXIR ORAL
Qty: 0 | Refills: 0 | COMMUNITY
Start: 2018-01-05

## 2018-01-05 RX ADMIN — OXYCODONE HYDROCHLORIDE 5 MILLIGRAM(S): 5 TABLET ORAL at 16:51

## 2018-01-05 RX ADMIN — PIPERACILLIN AND TAZOBACTAM 25 GRAM(S): 4; .5 INJECTION, POWDER, LYOPHILIZED, FOR SOLUTION INTRAVENOUS at 12:03

## 2018-01-05 RX ADMIN — Medication 650 MILLIGRAM(S): at 14:50

## 2018-01-05 RX ADMIN — CYCLOBENZAPRINE HYDROCHLORIDE 5 MILLIGRAM(S): 10 TABLET, FILM COATED ORAL at 09:18

## 2018-01-05 RX ADMIN — LACTULOSE 20 GRAM(S): 10 SOLUTION ORAL at 17:40

## 2018-01-05 RX ADMIN — Medication 650 MILLIGRAM(S): at 15:20

## 2018-01-05 RX ADMIN — Medication 4 MILLIGRAM(S): at 05:08

## 2018-01-05 RX ADMIN — OXYCODONE HYDROCHLORIDE 5 MILLIGRAM(S): 5 TABLET ORAL at 17:20

## 2018-01-05 RX ADMIN — PIPERACILLIN AND TAZOBACTAM 25 GRAM(S): 4; .5 INJECTION, POWDER, LYOPHILIZED, FOR SOLUTION INTRAVENOUS at 04:07

## 2018-01-05 RX ADMIN — Medication 4 MILLIGRAM(S): at 17:40

## 2018-01-05 RX ADMIN — ENOXAPARIN SODIUM 40 MILLIGRAM(S): 100 INJECTION SUBCUTANEOUS at 12:03

## 2018-01-05 RX ADMIN — PANTOPRAZOLE SODIUM 40 MILLIGRAM(S): 20 TABLET, DELAYED RELEASE ORAL at 05:07

## 2018-01-05 NOTE — PROGRESS NOTE ADULT - PROBLEM SELECTOR PLAN 2
Agree with dexamethasone, pt does not describe "pain" .  Continues to describe back "spasm" relieved with flexeril while at home.  Has not received this inpatient. As discussed with Dr Zapata , start flexeril while here.
Agree with dexamethasone, pt does not describe "pain" .  States spasms are better today, has not required PRN Oxy PO
Controlled on current regimen
No evidence of cord compression. Symptoms likely 2/2 overall deconditioning and disease progression.
Controlled on current regimen
Onc input on treatment plan/prognosis.
Prognosis guarded but pt reluctant to accept hospice at this time

## 2018-01-05 NOTE — PROGRESS NOTE ADULT - PROBLEM SELECTOR PLAN 4
Discussed at length progression of disease, any disease modifying therapies would only worsen functional status and likely cause worsening quality of life with very little or no benefit.   RT to upper spine might be considered but no further chemo therapies being offered.    Pt and wife would like a short stay in [facility], while home renovations completed.  It would highly benefit this patient to have his rehab at [facility] so that any complications can be dealt within the system that knows this patient.  Family in agreement with DNR/DNI as discussed with wife Cesia,  disposition after rehab is home with hospice.Will sign off , please reconsult with any new issues.
Prognosis guarded but pt reluctant to accept hospice at this time. Outpatient follow up.
Shares desire to continue any and all cancer treatments. Verbalizes understanding that there is progression of his disease process but continues to desire to   "fight the fight".  Pt is a retired [place]  and describes his experience with people who have experienced medical trauma that as long as there is a heartbeat there is life and the will to fight.   We discussed Advance Directives.  States he wishes to discuss further with his wife Cesia but at this time would want everything done despite advance illness.  I have left him my contact numbers and invited he and his family to reach out for any further discussion. Please re consult if Hem/Onc is not offering any disease modifying therapies.
Some intermittent confusion noted. It seems subtle and manifests in forgetfulness and tangential speech.
Now voiding but intermittently incontinent.
Prognosis guarded but pt reluctant to accept hospice at this time. Outpatient follow up.
Now voiding but intermittently incontinent.

## 2018-01-05 NOTE — DISCHARGE NOTE ADULT - PATIENT PORTAL LINK FT
“You can access the [facility] Patient Portal, offered by [facility], by registering with the following website: [email or web address]”

## 2018-01-05 NOTE — DISCHARGE NOTE ADULT - PLAN OF CARE
Improved Physical therapy continue with pain medication and PT as indicated Follow up with your Oncologist

## 2018-01-05 NOTE — PROGRESS NOTE ADULT - PROBLEM SELECTOR PROBLEM 4
Confusional state
Encounter for palliative care
Encounter for palliative care
Prostate cancer
Prostate cancer
Urinary retention
Urinary retention

## 2018-01-05 NOTE — PROGRESS NOTE ADULT - PROBLEM SELECTOR PLAN 3
Explained the importance of good pain control to patient.   Recommend restarting Flexeril and Oxycodone PRN   Will monitor pt over the next day or so, if symptoms not easily controlled, will consider rad onc consult for in patient RT
IN setting of progression of disease.
IN setting of progression of disease.
Unclear etiology. On empiric zosyn. Infectious workup negative so far.
Febrile on admission. No clear source. Currently on empiric abx- will d/c if no recurrent fever
Unclear etiology. Cultures negative. Will transition to po levaquin.
Febrile on admission. No clear source- cx negative so far.

## 2018-01-05 NOTE — DISCHARGE NOTE ADULT - MEDICATION SUMMARY - MEDICATIONS TO TAKE
I will START or STAY ON the medications listed below when I get home from the hospital:    dexamethasone 4 mg oral tablet  -- 1 tab(s) by mouth once a day  -- Indication: For Pain of metastatic malignancy    oxyCODONE 5 mg oral capsule  -- 1 cap(s) by mouth every 4 hours, As Needed - for severe pain  -- Caution federal law prohibits the transfer of this drug to any person other  than the person for whom it was prescribed.  It is very important that you take or use this exactly as directed.  Do not skip doses or discontinue unless directed by your doctor.  May cause drowsiness.  Alcohol may intensify this effect.  Use care when operating dangerous machinery.  This prescription cannot be refilled.  Using more of this medication than prescribed may cause serious breathing problems.    -- Indication: For Pain of metastatic malignancy    Flomax 0.4 mg oral capsule  -- 1 cap(s) by mouth once a day  -- Indication: For Prostate cancer    lactulose 10 g/15 mL oral syrup  -- 30 milliliter(s) by mouth once a day, As needed, Constipation  -- Indication: For Constipation    cyclobenzaprine 5 mg oral tablet  -- 1 tab(s) by mouth 3 times a day, As needed, Muscle Spasm  -- Indication: For Muscle spasm    pantoprazole 40 mg oral delayed release tablet  -- 1 tab(s) by mouth once a day (before a meal)  -- Indication: For GERD    levoFLOXacin 500 mg oral tablet  -- 1 tab(s) by mouth every 24 hours for 5 days  -- Indication: For Fever

## 2018-01-05 NOTE — DISCHARGE NOTE ADULT - HOSPITAL COURSE
62 M w metastatic prostate ca aw weakness, back pain/spasms, fever of 102.     No cord compression seen on MRI. Symptoms improved on steroids.     Given empiric zosyn. Infectious workup negative.     Chronic LLE edema, not worse from baseline. US negative for DVT.     Follow up with oncologist after discharge. 62 M w/ stage IV prostate cancer w/ metastatic disease to the spine and liver p/w worsening b/l LE weakness. Recently started on Xtandi which he has not been able to tolerate due to back muscle spasms and fatigue. Had self discontinued it due to the side effects but his wife wanted him to take another dose which worsened his symptoms. C/o urinary and bowel incontinence as well as urinary retention while in the ED requiring catherization.     Fever and sepsis present on admission. Started on pain control, iv hydration. IV zosyn given for presumed gram negative and anaerobic bacterial infection.     MRI done: Cervical spine: Extensive osseous metastasis. Spinal cord signal abnormality at C5 level--neoplastic or white matter disease. Thoracic spine: Extensive osseous metastasis with spinal canal stenosis at T1, T2, T5, T6 and T7 level due to anterior or posterior epidural masses. Lumbar spine: Extensive osseous metastasis slightly more progressed since prior. Decreased spinal canal encroachment by anterior epidural mass at L2. Degenerative spinal canal stenosis at L3-4 and L4-5.    IV decadron given with improvement in pain and weakness. Progression of disease d/w pt, he requested rehab and outpatient follow up.     Fever resolved, blood cultures remained negative. Pt able to ambulate with PT.     Discharged to rehab.     Diagnoses: Leg weakness; Pain due to metastases; Prostate cancer; Bone metastases; Retention of urine; Fever; Sepsis; Metabolic encephalopathy; Dehydration; Moderate protein calorie malnutrition 62 M w/ stage IV prostate cancer w/ metastatic disease to the spine and liver p/w worsening b/l LE weakness. Recently started on Xtandi which he has not been able to tolerate due to back muscle spasms and fatigue. Had self discontinued it due to the side effects but his wife wanted him to take another dose which worsened his symptoms. C/o urinary and bowel incontinence as well as urinary retention while in the ED requiring catherization.     Fever and sepsis present on admission. Started on pain control, iv hydration. IV zosyn given for presumed gram negative and anaerobic bacterial infection.     MRI done: Cervical spine: Extensive osseous metastasis. Spinal cord signal abnormality at C5 level--neoplastic or white matter disease. Thoracic spine: Extensive osseous metastasis with spinal canal stenosis at T1, T2, T5, T6 and T7 level due to anterior or posterior epidural masses. Lumbar spine: Extensive osseous metastasis slightly more progressed since prior. Decreased spinal canal encroachment by anterior epidural mass at L2. Degenerative spinal canal stenosis at L3-4 and L4-5.    IV decadron given with improvement in pain and weakness. Progression of disease d/w pt, he requested rehab and outpatient follow up.     Fever resolved, blood cultures remained negative. Pt able to ambulate with PT.     Discharged to rehab.     Diagnoses: Leg weakness; Pain due to metastases; Prostate cancer; Bone metastases; Retention of urine; Fever; Sepsis; Metabolic encephalopathy; Dehydration; Moderate protein calorie malnutrition; Constipation; GERD

## 2018-01-05 NOTE — DISCHARGE NOTE ADULT - SECONDARY DIAGNOSIS.
Pain of metastatic malignancy Essential hypertension Prostate cancer SIRS (systemic inflammatory response syndrome)

## 2018-01-05 NOTE — PROGRESS NOTE ADULT - SUBJECTIVE AND OBJECTIVE BOX
Patient is a 62y old  Male who presents with a chief complaint of blood in stool (05 Jan 2018 15:14)    HPI: Pt up in chair. Feels well. Afebrile overnight.     01-04    137  |  101  |  25<H>  ----------------------------<  110<H>  4.7   |  22  |  1.05    Ca    9.6      04 Jan 2018 16:40                          9.9    12.0  )-----------( 607      ( 04 Jan 2018 16:40 )             30.6       MEDICATIONS  (STANDING):  dexamethasone     Tablet 4 milliGRAM(s) Oral two times a day  enoxaparin Injectable 40 milliGRAM(s) SubCutaneous daily  levoFLOXacin  Tablet 500 milliGRAM(s) Oral every 24 hours  pantoprazole    Tablet 40 milliGRAM(s) Oral before breakfast  tamsulosin 0.4 milliGRAM(s) Oral at bedtime    MEDICATIONS  (PRN):  acetaminophen   Tablet 650 milliGRAM(s) Oral every 6 hours PRN For Temp greater than 38 C (100.4 F)  cyclobenzaprine 5 milliGRAM(s) Oral three times a day PRN Muscle Spasm  lactulose Syrup 20 Gram(s) Oral daily PRN Constipation  oxyCODONE    IR 5 milliGRAM(s) Oral every 4 hours PRN Severe Pain (7 - 10)

## 2018-01-05 NOTE — PROGRESS NOTE ADULT - MUSCULOSKELETAL
No joint pain, swelling or deformity; no limitation of movement

## 2018-01-05 NOTE — PROGRESS NOTE ADULT - PROBLEM SELECTOR PROBLEM 3
Back spasm
Fever
Weakness of both lower extremities
Weakness of both lower extremities
Fever
SIRS (systemic inflammatory response syndrome)
SIRS (systemic inflammatory response syndrome)

## 2018-01-05 NOTE — PROGRESS NOTE ADULT - ATTENDING COMMENTS
[phone]
Awaiting rehab placement
D/c to rehab. D/c time 45 minutes
Spoke with pt and his wife- they are unsure of rehab/home PT. Will discuss and let us know

## 2018-01-05 NOTE — PROGRESS NOTE ADULT - PROBLEM SELECTOR PROBLEM 1
Prostate cancer
Weakness of both lower extremities

## 2018-01-05 NOTE — PROGRESS NOTE ADULT - ASSESSMENT
61 y/o with metastatic prostate cancer to spine with cord compression and liver.  Called for goals of care .
62 M w metastatic prostate ca aw weakness, back pain/spasms, fever of 102.
62 M w/ metastatic prostate ca, multiple lines of systemic therapy, a/w worsening back pain and inability to ambulate.
63 y/o with metastatic prostate cancer to spine with no cord compression and liver.  Called for goals of care .
62 M w metastatic prostate ca aw b/l LE weakness, fever of 102.     MRI Cervical spine: Extensive osseous metastasis. Spinal cord signal abnormality at C5 level--neoplastic or white matter disease.    Thoracic spine: Extensive osseous metastasis with spinal canal stenosis at T1, T2, T5, T6 and T7 level due to anterior or posterior epidural masses.    Lumbar spine: Extensive osseous metastasis slightly more progressed since prior. Decreased spinal canal encroachment by anterior epidural mass at L2. Degenerative spinal canal stenosis at L3-4 and L4-5.
62 M w metastatic prostate ca aw weakness, back pain/spasms, fever of 102. Improved on steroids. Infectious w/u negative.
62 M w metastatic prostate ca aw weakness, back pain/spasms, fever of 102.

## 2018-01-05 NOTE — DISCHARGE NOTE ADULT - CARE PROVIDER_API CALL
Olman Oshea), Hematology; Internal Medicine; Medical Oncology  [address] [place] [address]  Phone: [phone]  Fax: [phone]

## 2018-01-05 NOTE — PROGRESS NOTE ADULT - PROBLEM SELECTOR PROBLEM 2
Back spasm
Back spasm
Pain of metastatic malignancy
Weakness of both lower extremities
Pain of metastatic malignancy
Prostate cancer
Prostate cancer

## 2018-01-05 NOTE — PROGRESS NOTE ADULT - NEUROLOGICAL
Alert & oriented; no sensory, motor or coordination deficits, normal reflexes
Alert & oriented; no sensory, motor or coordination deficits, normal reflexes
detailed exam
Alert & oriented; no sensory, motor or coordination deficits, normal reflexes

## 2018-01-05 NOTE — DISCHARGE NOTE ADULT - CARE PLAN
Principal Discharge DX:	Weakness of both lower extremities  Secondary Diagnosis:	Pain of metastatic malignancy  Secondary Diagnosis:	Essential hypertension  Secondary Diagnosis:	Prostate cancer  Secondary Diagnosis:	SIRS (systemic inflammatory response syndrome) Principal Discharge DX:	Weakness of both lower extremities  Goal:	Improved  Instructions for follow-up, activity and diet:	Physical therapy  Secondary Diagnosis:	Pain of metastatic malignancy  Secondary Diagnosis:	Prostate cancer Principal Discharge DX:	Weakness of both lower extremities  Goal:	Improved  Instructions for follow-up, activity and diet:	Physical therapy  Secondary Diagnosis:	Pain of metastatic malignancy  Instructions for follow-up, activity and diet:	continue with pain medication and PT as indicated  Secondary Diagnosis:	Prostate cancer  Instructions for follow-up, activity and diet:	Follow up with your Oncologist

## 2018-01-06 LAB
CULTURE RESULTS: SIGNIFICANT CHANGE UP
CULTURE RESULTS: SIGNIFICANT CHANGE UP
SPECIMEN SOURCE: SIGNIFICANT CHANGE UP
SPECIMEN SOURCE: SIGNIFICANT CHANGE UP

## 2018-01-11 ENCOUNTER — APPOINTMENT (OUTPATIENT)
Dept: HEMATOLOGY ONCOLOGY | Facility: CLINIC | Age: 63
End: 2018-01-11

## 2018-03-09 ENCOUNTER — RX RENEWAL (OUTPATIENT)
Age: 63
End: 2018-03-09

## 2018-03-09 RX ORDER — DRONABINOL 5 MG/1
5 CAPSULE ORAL TWICE DAILY
Qty: 60 | Refills: 0 | Status: ACTIVE | COMMUNITY
Start: 2018-03-09 | End: 1900-01-01

## 2018-05-05 NOTE — ED PROVIDER NOTE - NSCAREINITIATED _GEN_ER
Patient discharged home with all belongings, paperwork, and instructions. All questions were answered. Patient verbalized understanding of all information given.     
Stef Lozano(Resident)

## 2018-06-11 NOTE — PROGRESS NOTE ADULT - CONSTITUTIONAL
Well-developed, well nourished
CHF with EF 10-15% 2/2 ischemic cardiomyopathy s/p AICD. Elevated BNP on admission with R sided effusion and edema. Patient with persistent pleural effusions on CXR and US and b/l dependent edema. Assisted now with HD.  -HOLDING ACE/BB in setting of sepsis/ hypotension  - c/w midodrine  - anuric, therefore no Lasix/diuresis  - c/w HD for fluid balance    #CAD- Hx of MI and ischemic CM s/p AICD, STEMI 7/2017, was started on Aspirin and Brilinta.   - c/w aspirin 81 and Atorvastatin 80mg qhs

## 2018-07-10 ENCOUNTER — APPOINTMENT (OUTPATIENT)
Dept: UROLOGY | Facility: CLINIC | Age: 63
End: 2018-07-10

## 2021-06-15 NOTE — PROGRESS NOTE ADULT - PROBLEM SELECTOR PLAN 1
[Post Op: _________] : a [unfilled] post op visit
Advanced disease with mets to liver and thoracic spine with no cord compression   S/P meeting with pt, pt wife, Dr Zapata (hem/onc) and myself
Advanced disease with mets to thoracic spine with cord compression and liver.  Await Hem/Onc input.  Known to Dr Oshea at [facility]
Family meeting held today with pt, wife and palliative care NP. Summarized the current condition of prostate cancer, previous treatments. Discussed that pt's performance status is poor 2/2 combination of prior therapies and disease progression. I do not recommend further disease modifying therapy as it will likely decrease length and quality of his life. explained this to the pt who was teary but accepting. He continues to want to "fight". We explained that we are now fighting for new things: pain relief, quality of life.   We discussed possible d/c to a short term rehab, [facility] would be ideal followed by home with hospice services.   f/u with Dr. Oshea as well
No cord compression seen on MRI. Symptoms improved on steroids.
No cord compression seen on MRI. Symptoms improved on steroids.  PT eval
No cord compression seen on MRI. Symptoms improved on steroids. Pt attributes weakness to chemo drug Xtandi- now stopped.
No cord compression seen on MRI. Symptoms improved on steroids.  Seen by PT today, rehab recommended

## 2021-07-27 NOTE — ED ADULT TRIAGE NOTE - ACCOMPANIED BY
Spoke with pt daughter Raul Mead her of Neuropsychology Dr. Renny Landin [phone] . Advised her to call the office to make an appt and gave her the address (same as ours different [address] ) [phone]  Also advised her that her mom had a slight UTI Macrobid sent to pharmacy   Advised if anything changes or worsens to contact the office or seek medical attention. There was verbalizing understanding. Spouse/Significant other

## 2022-01-21 NOTE — PROGRESS NOTE ADULT - PROBLEM SELECTOR PROBLEM 6
Need for prophylactic measure
English
Need for prophylactic measure

## 2022-02-10 NOTE — PATIENT PROFILE ADULT. - HEALTH/HEALTHCARE ANXIETIES, PROFILE
none W Plasty Text: The lesion was extirpated to the level of the fat with a #15 scalpel blade.  Given the location of the defect, shape of the defect and the proximity to free margins a W-plasty was deemed most appropriate for repair.  Using a sterile surgical marker, the appropriate transposition arms of the W-plasty were drawn incorporating the defect and placing the expected incisions within the relaxed skin tension lines where possible.    The area thus outlined was incised deep to adipose tissue with a #15 scalpel blade.  The skin margins were undermined to an appropriate distance in all directions utilizing iris scissors.  The opposing transposition arms were then transposed into place in opposite direction and anchored with interrupted buried subcutaneous sutures.

## 2022-12-21 NOTE — PROGRESS NOTE ADULT - RESPIRATORY
Breath Sounds equal & clear to percussion & auscultation, no accessory muscle use
show

## 2023-02-07 NOTE — PHYSICAL THERAPY INITIAL EVALUATION ADULT - WEIGHT-BEARING RESTRICTIONS: STAND/SIT, REHAB EVAL
MEDICATIONS  (STANDING):  lidocaine   4% Patch 1 Patch Transdermal once  loratadine 10 milliGRAM(s) Oral daily  metoprolol succinate ER 50 milliGRAM(s) Oral daily  Orgovyx (relugolix) 120mg tab 1 Tablet(s) 1 Tablet(s) Oral daily  polyethylene glycol 3350 17 Gram(s) Oral daily    MEDICATIONS  (PRN):  acetaminophen     Tablet .. 650 milliGRAM(s) Oral every 6 hours PRN Temp greater or equal to 38C (100.4F), Mild Pain (1 - 3)  aluminum hydroxide/magnesium hydroxide/simethicone Suspension 30 milliLiter(s) Oral every 4 hours PRN Dyspepsia  melatonin 3 milliGRAM(s) Oral at bedtime PRN Insomnia  ondansetron Injectable 4 milliGRAM(s) IV Push every 8 hours PRN Nausea and/or Vomiting   weight-bearing as tolerated

## 2023-06-22 NOTE — ED PROVIDER NOTE - NS ED MD DISPO ISOLATION TYPES
Where Is Your Psoriasis Located?: Extremities, hands, and scalp
Please List The Treatments That Have Worked Best For Your Psoriasis: (Separate Each Entry With A Comma): Stelara
None

## 2024-01-22 NOTE — ED PROVIDER NOTE - CROS ED GU ALL NEG
Patient arrived to er with LLQ abd pain that started yesterday. Patient seen at [facility] and sent to er.    no fever and no chills. - - -

## 2024-05-08 NOTE — ED ADULT NURSE NOTE - OBJECTIVE STATEMENT
Hide Additional Notes?: No Detail Level: Simple pt presents to ED w. complaints of lower back spasms, increasing in severity over 2 days Pt has hx of Prostate CA, ambulatory A&Ox3. Pt denies bowel/bladder issues. No CVA tenderness. Pt has episode in past but no resolve to pain/spasms

## 2024-11-24 NOTE — ED PROVIDER NOTE - MEDICAL DECISION MAKING DETAILS
60 yo with hx of prostate ca, sciatica, htn, comes to the ED with complaints of right lower and left upper back pain for the past few days, has had this pain in the past before, no f.c no redflag back pain symtpoms, but given CA hx will check labs, xray and reassess.
no

## 2025-05-16 NOTE — PROGRESS NOTE ADULT - NS NEC GEN PE MLT EXAM PC
Outreach attempt was made to schedule a Medicare Wellness Visit. This was the first attempt. Contact was made, [facility] appointment scheduled.  
No bruits; no thyromegaly or nodules